# Patient Record
Sex: MALE | Race: WHITE | NOT HISPANIC OR LATINO | Employment: OTHER | URBAN - METROPOLITAN AREA
[De-identification: names, ages, dates, MRNs, and addresses within clinical notes are randomized per-mention and may not be internally consistent; named-entity substitution may affect disease eponyms.]

---

## 2017-01-10 ENCOUNTER — HOSPITAL ENCOUNTER (OUTPATIENT)
Dept: PAIN MEDICINE | Facility: HOSPITAL | Age: 67
Discharge: HOME | End: 2017-01-10
Attending: ANESTHESIOLOGY | Admitting: ANESTHESIOLOGY

## 2017-02-01 ENCOUNTER — HOSPITAL ENCOUNTER (OUTPATIENT)
Dept: PAIN MEDICINE | Facility: HOSPITAL | Age: 67
Discharge: HOME | End: 2017-02-01
Attending: ANESTHESIOLOGY | Admitting: ANESTHESIOLOGY

## 2017-02-16 ENCOUNTER — HOSPITAL ENCOUNTER (OUTPATIENT)
Dept: PAIN MEDICINE | Facility: HOSPITAL | Age: 67
Discharge: HOME | End: 2017-02-16
Attending: ANESTHESIOLOGY | Admitting: ANESTHESIOLOGY

## 2017-02-16 LAB
AMPHETAMINES UR QL SCN: NEGATIVE
BZE UR QL SCN: NEGATIVE
CREAT 24H UR-MCNC: NORMAL MG/DL
METHADONE UR QL SCN: NEGATIVE
OPIATE CONFIRMATION URINE: NORMAL
THC SERPLBLD CFM-MCNC: NEGATIVE NG/ML

## 2017-02-28 ENCOUNTER — HOSPITAL ENCOUNTER (OUTPATIENT)
Dept: PAIN MEDICINE | Facility: HOSPITAL | Age: 67
Discharge: HOME | End: 2017-02-28
Attending: ANESTHESIOLOGY | Admitting: ANESTHESIOLOGY

## 2017-03-07 ENCOUNTER — HOSPITAL ENCOUNTER (OUTPATIENT)
Dept: PAIN MEDICINE | Facility: HOSPITAL | Age: 67
Discharge: HOME | End: 2017-03-07
Attending: ANESTHESIOLOGY | Admitting: ANESTHESIOLOGY

## 2017-03-17 ENCOUNTER — HOSPITAL ENCOUNTER (OUTPATIENT)
Dept: PAIN MEDICINE | Facility: HOSPITAL | Age: 67
Discharge: HOME | End: 2017-03-17
Attending: ANESTHESIOLOGY | Admitting: ANESTHESIOLOGY

## 2017-04-28 ENCOUNTER — HOSPITAL ENCOUNTER (OUTPATIENT)
Dept: PAIN MEDICINE | Facility: HOSPITAL | Age: 67
Discharge: HOME | End: 2017-04-28
Attending: ANESTHESIOLOGY | Admitting: ANESTHESIOLOGY

## 2021-04-14 ENCOUNTER — ON CAMPUS - OUTPATIENT (OUTPATIENT)
Dept: URBAN - METROPOLITAN AREA HOSPITAL 77 | Facility: HOSPITAL | Age: 71
End: 2021-04-14

## 2021-04-14 DIAGNOSIS — R19.7 DIARRHEA, UNSPECIFIED: ICD-10-CM

## 2021-04-14 DIAGNOSIS — K62.1 RECTAL POLYP: ICD-10-CM

## 2021-04-14 DIAGNOSIS — R19.4 CHANGE IN BOWEL HABIT: ICD-10-CM

## 2021-04-14 PROCEDURE — 45380 COLONOSCOPY AND BIOPSY: CPT | Performed by: INTERNAL MEDICINE

## 2022-02-07 ENCOUNTER — OFFICE (OUTPATIENT)
Dept: URBAN - METROPOLITAN AREA CLINIC 64 | Facility: CLINIC | Age: 72
End: 2022-02-07

## 2022-02-07 VITALS
WEIGHT: 154 LBS | HEART RATE: 57 BPM | SYSTOLIC BLOOD PRESSURE: 167 MMHG | HEIGHT: 67 IN | DIASTOLIC BLOOD PRESSURE: 100 MMHG

## 2022-02-07 DIAGNOSIS — R19.7 DIARRHEA, UNSPECIFIED: ICD-10-CM

## 2022-02-07 PROCEDURE — 99213 OFFICE O/P EST LOW 20 MIN: CPT | Performed by: INTERNAL MEDICINE

## 2022-02-07 NOTE — SERVICEHPINOTES
EDUARDO HARGROVE  is a    71   year old  male   who is being seen in the office today for change in bowel habits with loose bm. He has 2-3 loose bm daily and previously had one formed bm daily. No brbpr or melena. No wt loss or abd pain.  No med changes. He has had symptoms for over a year and had colonoscopy last year with results below. br
br
br
br  2021 colonoscopy normal mucosa, bx neg, hp polyp

## 2022-06-28 ENCOUNTER — OFFICE (OUTPATIENT)
Dept: URBAN - METROPOLITAN AREA CLINIC 64 | Facility: CLINIC | Age: 72
End: 2022-06-28

## 2022-06-28 VITALS
SYSTOLIC BLOOD PRESSURE: 130 MMHG | HEIGHT: 67 IN | DIASTOLIC BLOOD PRESSURE: 78 MMHG | HEART RATE: 60 BPM | WEIGHT: 155 LBS

## 2022-06-28 DIAGNOSIS — R19.7 DIARRHEA, UNSPECIFIED: ICD-10-CM

## 2022-06-28 PROCEDURE — 99213 OFFICE O/P EST LOW 20 MIN: CPT | Performed by: INTERNAL MEDICINE

## 2023-01-19 ENCOUNTER — HOSPITAL ENCOUNTER (OUTPATIENT)
Facility: HOSPITAL | Age: 73
Discharge: HOME | End: 2023-01-19
Attending: EMERGENCY MEDICINE | Admitting: EMERGENCY MEDICINE
Payer: MEDICARE

## 2023-01-19 VITALS
HEART RATE: 61 BPM | BODY MASS INDEX: 24.33 KG/M2 | DIASTOLIC BLOOD PRESSURE: 87 MMHG | SYSTOLIC BLOOD PRESSURE: 153 MMHG | OXYGEN SATURATION: 97 % | TEMPERATURE: 98.3 F | HEIGHT: 67 IN | RESPIRATION RATE: 13 BRPM | WEIGHT: 155 LBS

## 2023-01-19 DIAGNOSIS — B02.9 HERPES ZOSTER WITHOUT COMPLICATION: Primary | ICD-10-CM

## 2023-01-19 DIAGNOSIS — R21 RASH: ICD-10-CM

## 2023-01-19 PROCEDURE — G0463 HOSPITAL OUTPT CLINIC VISIT: HCPCS | Performed by: EMERGENCY MEDICINE

## 2023-01-19 PROCEDURE — 99203 OFFICE O/P NEW LOW 30 MIN: CPT | Performed by: EMERGENCY MEDICINE

## 2023-01-19 RX ORDER — PREDNISONE 20 MG/1
20 TABLET ORAL 2 TIMES DAILY
Qty: 10 TABLET | Refills: 0 | Status: SHIPPED | OUTPATIENT
Start: 2023-01-19 | End: 2023-01-24

## 2023-01-19 RX ORDER — ACYCLOVIR 400 MG/1
800 TABLET ORAL
Qty: 70 TABLET | Refills: 0 | Status: SHIPPED | OUTPATIENT
Start: 2023-01-19 | End: 2023-01-26

## 2023-01-19 NOTE — FSED PROVIDER NOTE
Subjective   History of Present Illness  Patient is a 72-year-old who presents with a 2-day history of a rash that is prickly and tingly with pain.  It was vesicular and was crusted over.  Symptoms were gradual onset.  They are constant.  He noticed the pain more at night.        Review of Systems   Constitutional: Negative.    HENT: Negative.  Negative for sinus pressure and sore throat.    Eyes: Negative.    Respiratory: Negative.  Negative for cough and shortness of breath.    Cardiovascular: Negative.  Negative for chest pain.   Gastrointestinal: Negative for abdominal pain, diarrhea, nausea and vomiting.   Endocrine: Negative.    Genitourinary: Negative.    Musculoskeletal: Negative.    Skin: Positive for rash.   Allergic/Immunologic: Negative.    Neurological: Negative.  Negative for syncope, numbness and headaches.   Hematological: Negative.    Psychiatric/Behavioral: Negative.  Negative for confusion.   All other systems reviewed and are negative.      History reviewed. No pertinent past medical history.    No Known Allergies    History reviewed. No pertinent surgical history.    History reviewed. No pertinent family history.    Social History     Socioeconomic History   • Marital status:            Objective   Physical Exam  Vitals and nursing note reviewed.   Constitutional:       Appearance: Normal appearance. He is normal weight.   HENT:      Head: Normocephalic and atraumatic.      Right Ear: External ear normal.      Left Ear: External ear normal.      Nose: Nose normal.      Mouth/Throat:      Mouth: Mucous membranes are moist.      Pharynx: Oropharynx is clear.   Eyes:      Extraocular Movements: Extraocular movements intact.      Conjunctiva/sclera: Conjunctivae normal.      Pupils: Pupils are equal, round, and reactive to light.   Cardiovascular:      Rate and Rhythm: Normal rate and regular rhythm.      Pulses: Normal pulses.      Heart sounds: Normal heart sounds.   Pulmonary:      Effort:  Pulmonary effort is normal.      Breath sounds: Normal breath sounds.   Abdominal:      General: Abdomen is flat.      Palpations: Abdomen is soft.      Tenderness: There is no abdominal tenderness.   Musculoskeletal:         General: Normal range of motion.      Cervical back: Normal range of motion and neck supple. No rigidity.   Skin:     General: Skin is warm and dry.      Findings: Rash present.      Comments: Healing vesicular rash on the right forearm consistent with a dermatomal distribution   Neurological:      General: No focal deficit present.      Mental Status: He is alert and oriented to person, place, and time. Mental status is at baseline.      Cranial Nerves: No cranial nerve deficit.      Sensory: No sensory deficit.      Motor: No weakness.      Gait: Gait normal.   Psychiatric:         Mood and Affect: Mood normal.         Behavior: Behavior normal.         Thought Content: Thought content normal.         Judgment: Judgment normal.         Procedures           ED Course                                           Medical Decision Making  This patient who presents with rash for 2 days  consistent with shingles. History and exam findings not consistent with dangerous etiologies of rash such as SJS/TEN, or secondary dangerous causes such as petechial rashes from thrombocytopenia or rickettsial infections. Rash does not appear urticarial with no signs of anaphylaxis either. Plan at this time is to treat symptomatically, instruct to follow up with PCP or derm PRN.    Herpes zoster without complication: complicated acute illness or injury  Rash: complicated acute illness or injury  Risk  Prescription drug management.          Final diagnoses:   Herpes zoster without complication   Rash       ED Disposition  ED Disposition     ED Disposition   Discharge    Condition   Stable    Comment   --             Facundo Root MD  [address] [facility]  [address]  [place]  [address] [phone]    Schedule an appointment as soon as possible for a visit in 2 days           Medication List      New Prescriptions    acyclovir 400 MG tablet  Commonly known as: ZOVIRAX  Take 2 tablets by mouth 5 (Five) Times a Day for 7 days. Take no more than 5 doses a day.     predniSONE 20 MG tablet  Commonly known as: DELTASONE  Take 1 tablet by mouth 2 (Two) Times a Day for 5 days.           Where to Get Your Medications      These medications were sent to [facility]/pharmacy #[id number] - [place], [place] - [address] [place] [address] [phone]  - [phone] [address] [place] [address]    Hours: 24-hours Phone: [phone]   · acyclovir 400 MG tablet  · predniSONE 20 MG tablet

## 2023-09-24 ENCOUNTER — HOSPITAL ENCOUNTER (EMERGENCY)
Facility: HOSPITAL | Age: 73
Discharge: HOME | End: 2023-09-24
Attending: PEDIATRICS | Admitting: PEDIATRICS
Payer: MEDICARE

## 2023-09-24 ENCOUNTER — APPOINTMENT (OUTPATIENT)
Dept: CT IMAGING | Facility: HOSPITAL | Age: 73
End: 2023-09-24
Payer: MEDICARE

## 2023-09-24 VITALS
BODY MASS INDEX: 24.33 KG/M2 | SYSTOLIC BLOOD PRESSURE: 137 MMHG | DIASTOLIC BLOOD PRESSURE: 84 MMHG | HEIGHT: 67 IN | HEART RATE: 57 BPM | OXYGEN SATURATION: 95 % | WEIGHT: 155 LBS | RESPIRATION RATE: 18 BRPM | TEMPERATURE: 97.9 F

## 2023-09-24 DIAGNOSIS — K04.7 DENTAL INFECTION: Primary | ICD-10-CM

## 2023-09-24 LAB
ALBUMIN SERPL-MCNC: 4.7 G/DL (ref 3.5–5.2)
ALBUMIN/GLOB SERPL: 1.5 G/DL
ALP SERPL-CCNC: 70 U/L (ref 39–117)
ALT SERPL W P-5'-P-CCNC: 16 U/L (ref 1–41)
ANION GAP SERPL CALCULATED.3IONS-SCNC: 9.9 MMOL/L (ref 5–15)
AST SERPL-CCNC: 16 U/L (ref 1–40)
BASOPHILS # BLD AUTO: 0.03 10*3/MM3 (ref 0–0.2)
BASOPHILS NFR BLD AUTO: 0.3 % (ref 0–1.5)
BILIRUB SERPL-MCNC: 0.4 MG/DL (ref 0–1.2)
BUN SERPL-MCNC: 13 MG/DL (ref 8–23)
BUN/CREAT SERPL: 14.9 (ref 7–25)
CALCIUM SPEC-SCNC: 9.6 MG/DL (ref 8.6–10.5)
CHLORIDE SERPL-SCNC: 99 MMOL/L (ref 98–107)
CO2 SERPL-SCNC: 28.1 MMOL/L (ref 22–29)
CREAT SERPL-MCNC: 0.87 MG/DL (ref 0.76–1.27)
D-LACTATE SERPL-SCNC: 0.9 MMOL/L (ref 0.5–2)
DEPRECATED RDW RBC AUTO: 43 FL (ref 37–54)
EGFRCR SERPLBLD CKD-EPI 2021: 90 ML/MIN/1.73
EOSINOPHIL # BLD AUTO: 0.2 10*3/MM3 (ref 0–0.4)
EOSINOPHIL NFR BLD AUTO: 2.2 % (ref 0.3–6.2)
ERYTHROCYTE [DISTWIDTH] IN BLOOD BY AUTOMATED COUNT: 12.5 % (ref 12.3–15.4)
GLOBULIN UR ELPH-MCNC: 3.1 GM/DL
GLUCOSE SERPL-MCNC: 87 MG/DL (ref 65–99)
HCT VFR BLD AUTO: 46.6 % (ref 37.5–51)
HGB BLD-MCNC: 15.3 G/DL (ref 13–17.7)
IMM GRANULOCYTES # BLD AUTO: 0.02 10*3/MM3 (ref 0–0.05)
IMM GRANULOCYTES NFR BLD AUTO: 0.2 % (ref 0–0.5)
LYMPHOCYTES # BLD AUTO: 2.13 10*3/MM3 (ref 0.7–3.1)
LYMPHOCYTES NFR BLD AUTO: 23.9 % (ref 19.6–45.3)
MCH RBC QN AUTO: 30.3 PG (ref 26.6–33)
MCHC RBC AUTO-ENTMCNC: 32.8 G/DL (ref 31.5–35.7)
MCV RBC AUTO: 92.3 FL (ref 79–97)
MONOCYTES # BLD AUTO: 0.89 10*3/MM3 (ref 0.1–0.9)
MONOCYTES NFR BLD AUTO: 10 % (ref 5–12)
NEUTROPHILS NFR BLD AUTO: 5.65 10*3/MM3 (ref 1.7–7)
NEUTROPHILS NFR BLD AUTO: 63.4 % (ref 42.7–76)
PLATELET # BLD AUTO: 211 10*3/MM3 (ref 140–450)
PMV BLD AUTO: 10.9 FL (ref 6–12)
POTASSIUM SERPL-SCNC: 3.9 MMOL/L (ref 3.5–5.2)
PROT SERPL-MCNC: 7.8 G/DL (ref 6–8.5)
RBC # BLD AUTO: 5.05 10*6/MM3 (ref 4.14–5.8)
SODIUM SERPL-SCNC: 137 MMOL/L (ref 136–145)
WBC NRBC COR # BLD: 8.92 10*3/MM3 (ref 3.4–10.8)

## 2023-09-24 PROCEDURE — 96365 THER/PROPH/DIAG IV INF INIT: CPT

## 2023-09-24 PROCEDURE — 99285 EMERGENCY DEPT VISIT HI MDM: CPT

## 2023-09-24 PROCEDURE — 25010000002 KETOROLAC TROMETHAMINE PER 15 MG

## 2023-09-24 PROCEDURE — 87040 BLOOD CULTURE FOR BACTERIA: CPT

## 2023-09-24 PROCEDURE — 83605 ASSAY OF LACTIC ACID: CPT

## 2023-09-24 PROCEDURE — 70487 CT MAXILLOFACIAL W/DYE: CPT

## 2023-09-24 PROCEDURE — 85025 COMPLETE CBC W/AUTO DIFF WBC: CPT

## 2023-09-24 PROCEDURE — 96375 TX/PRO/DX INJ NEW DRUG ADDON: CPT

## 2023-09-24 PROCEDURE — 36415 COLL VENOUS BLD VENIPUNCTURE: CPT

## 2023-09-24 PROCEDURE — 80053 COMPREHEN METABOLIC PANEL: CPT

## 2023-09-24 PROCEDURE — 25010000002 DEXAMETHASONE SODIUM PHOSPHATE 10 MG/ML SOLUTION

## 2023-09-24 PROCEDURE — 25510000001 IOPAMIDOL PER 1 ML: Performed by: PEDIATRICS

## 2023-09-24 PROCEDURE — 25010000002 CEFTRIAXONE PER 250 MG

## 2023-09-24 RX ORDER — SODIUM CHLORIDE 0.9 % (FLUSH) 0.9 %
10 SYRINGE (ML) INJECTION AS NEEDED
Status: DISCONTINUED | OUTPATIENT
Start: 2023-09-24 | End: 2023-09-24 | Stop reason: HOSPADM

## 2023-09-24 RX ORDER — DEXAMETHASONE SODIUM PHOSPHATE 10 MG/ML
10 INJECTION, SOLUTION INTRAMUSCULAR; INTRAVENOUS ONCE
Status: COMPLETED | OUTPATIENT
Start: 2023-09-24 | End: 2023-09-24

## 2023-09-24 RX ORDER — KETOROLAC TROMETHAMINE 30 MG/ML
15 INJECTION, SOLUTION INTRAMUSCULAR; INTRAVENOUS ONCE
Status: COMPLETED | OUTPATIENT
Start: 2023-09-24 | End: 2023-09-24

## 2023-09-24 RX ORDER — AMOXICILLIN AND CLAVULANATE POTASSIUM 875; 125 MG/1; MG/1
1 TABLET, FILM COATED ORAL 2 TIMES DAILY
Qty: 20 TABLET | Refills: 0 | Status: SHIPPED | OUTPATIENT
Start: 2023-09-24 | End: 2023-10-04

## 2023-09-24 RX ADMIN — IOPAMIDOL 50 ML: 755 INJECTION, SOLUTION INTRAVENOUS at 15:16

## 2023-09-24 RX ADMIN — KETOROLAC TROMETHAMINE 15 MG: 30 INJECTION, SOLUTION INTRAMUSCULAR at 14:13

## 2023-09-24 RX ADMIN — CEFTRIAXONE 1000 MG: 1 INJECTION, POWDER, FOR SOLUTION INTRAMUSCULAR; INTRAVENOUS at 14:16

## 2023-09-24 RX ADMIN — DEXAMETHASONE SODIUM PHOSPHATE 10 MG: 10 INJECTION, SOLUTION INTRAMUSCULAR; INTRAVENOUS at 14:14

## 2023-09-24 NOTE — DISCHARGE INSTRUCTIONS
Take antibiotic as prescribed until completion.  Follow-up with dentist for an appointment as soon as possible.  Return to emergency department for worsening symptoms or other medical emergencies.  Recommended follow-up with PCP.  Refer to the attached instructions for further information.

## 2023-09-24 NOTE — FSED PROVIDER NOTE
Subjective   History of Present Illness  Patient is a 73-year-old male who presents for right facial and mouth swelling.  Symptoms onset yesterday and worsened today.  Reports severe tenderness right upper gum area.  Pain was an 8/10 yesterday, and a 4/10 today.  Has not been seen by dentist.  Denies fever, shortness of breath, chest pain.  Reports eating pork rind x2 nights ago that may have coincided with onset of the pain.      Review of Systems   Constitutional:  Negative for chills and fever.   HENT:  Positive for dental problem and facial swelling.    Eyes:  Positive for redness.   Respiratory:  Negative for shortness of breath.    Cardiovascular:  Negative for chest pain.   Gastrointestinal:  Negative for abdominal pain.   Skin:  Negative for color change, pallor, rash and wound.     History reviewed. No pertinent past medical history.    No Known Allergies    History reviewed. No pertinent surgical history.    History reviewed. No pertinent family history.    Social History     Socioeconomic History    Marital status:    Tobacco Use    Smoking status: Never    Smokeless tobacco: Never   Vaping Use    Vaping Use: Never used   Substance and Sexual Activity    Alcohol use: Never    Drug use: Never    Sexual activity: Defer           Objective   Physical Exam  Constitutional:       General: He is not in acute distress.     Appearance: He is normal weight. He is ill-appearing. He is not toxic-appearing.   HENT:      Head: Normocephalic and atraumatic.      Comments: Right-sided moderate to severe facial swelling extending from right thigh to right mouth, causing downward tilt of the right side mouth.  Minimal to no tenderness or erythema.     Nose: Nose normal.      Mouth/Throat:      Mouth: Mucous membranes are moist.      Dentition: Abnormal dentition. Dental tenderness, gingival swelling and dental caries present.      Tongue: No lesions.      Palate: No mass and lesions.      Pharynx: Oropharynx is  clear. Uvula midline. No pharyngeal swelling, posterior oropharyngeal erythema or uvula swelling.      Tonsils: No tonsillar exudate or tonsillar abscesses.        Comments: Upper right-sided molars with moderate to severe gum swelling, redness, erythema.  No evidence of drainage.  Generally poor dentition.  Eyes:      Extraocular Movements: Extraocular movements intact.      Pupils: Pupils are equal, round, and reactive to light.      Comments: Lower eyelid conjunctive a mildly erythematous and swollen.  No discharge.   Cardiovascular:      Rate and Rhythm: Normal rate and regular rhythm.   Pulmonary:      Effort: Pulmonary effort is normal. No respiratory distress.   Skin:     General: Skin is warm and dry.      Findings: No bruising or lesion.      Comments: Area of swelling on face is not indurated, erythematous, fluctuant.  No wounds or lesions.   Neurological:      Mental Status: He is alert.   Psychiatric:         Mood and Affect: Mood normal.         Behavior: Behavior normal.       Procedures           ED Course  ED Course as of 09/24/23 1627   Sun Sep 24, 2023   1440 WBC: 8.92 [AS]   1440 Neutrophil Rel %: 63.4 [AS]   1440 Hemoglobin: 15.3 [AS]   1513 Glucose: 87 [AS]   1513 Creatinine: 0.87 [AS]   1513 Sodium: 137 [AS]   1513 Potassium: 3.9 [AS]   1513 Chloride: 99 [AS]   1513 eGFR: 90.0 [AS]   1515 Lactate: 0.9 [AS]   1607 CT facial bones with contrast:   IMPRESSION:  1.Apical lucencies involving right maxillary second premolar and bilateral mandibular second molars, which could represent odontogenic infection.  2.No evidence of drainable soft tissue abscess.  3.Moderate frothy paranasal sinus mucosal disease. Correlate for acute sinusitis. [AS]      ED Course User Index  [AS] Yojana Rivero PA-C                                           Medical Decision Making  Patient is a 73-year-old male who presents to the emergency department for right facial and oral swelling.  Exam is consistent with a dental  infection.  Due to severity of swelling, will check lab work and facial bone CT to evaluate for extension into facial structures of bones.  Lab work-up is unremarkable.  Normal CBC, CMP, lactic.  CT reveals odontogenic infection, consistent with location of patient's symptoms on exam.  Patient given Rocephin, Toradol, Decadron IV.  Reports pain resolved prior to discharge.  Prescription for Augmentin sent to pharmacy.  Patient to follow-up with dentist as soon as possible for visit.  Discussed when to return to the emergency department.  Answered all questions.  Patient verbalized understanding and was agreeable to plan and discharge.    My differential diagnosis includes but is not limited to: Dental infection, dental abscess, osteomyelitis, sinusitis, conjunctivitis, sepsis        Final diagnoses:   Dental infection       ED Disposition  ED Disposition       ED Disposition   Discharge    Condition   Stable    Comment   --               Facundo Root MD  [address] [place] [address] [place] [address] [phone]    Schedule an appointment as soon as possible for a visit            Medication List        New Prescriptions      amoxicillin-clavulanate 875-125 MG per tablet  Commonly known as: AUGMENTIN  Take 1 tablet by mouth 2 (Two) Times a Day for 10 days.               Where to Get Your Medications        These medications were sent to [facility]/pharmacy #[id number] - [place], [place] - [address] [place] [address] - [phone]  - [phone] [address] [place] [address]      Hours: 24-hours Phone: [phone]   amoxicillin-clavulanate 875-125 MG per tablet

## 2023-09-29 LAB
BACTERIA SPEC AEROBE CULT: NORMAL
BACTERIA SPEC AEROBE CULT: NORMAL

## 2024-11-09 ENCOUNTER — HOSPITAL ENCOUNTER (INPATIENT)
Facility: HOSPITAL | Age: 74
LOS: 3 days | Discharge: HOME | End: 2024-11-12
Attending: EMERGENCY MEDICINE | Admitting: FAMILY MEDICINE
Payer: MEDICARE

## 2024-11-09 ENCOUNTER — APPOINTMENT (OUTPATIENT)
Dept: GENERAL RADIOLOGY | Facility: HOSPITAL | Age: 74
End: 2024-11-09
Payer: MEDICARE

## 2024-11-09 ENCOUNTER — APPOINTMENT (OUTPATIENT)
Dept: MRI IMAGING | Facility: HOSPITAL | Age: 74
End: 2024-11-09
Payer: MEDICARE

## 2024-11-09 ENCOUNTER — APPOINTMENT (OUTPATIENT)
Dept: CT IMAGING | Facility: HOSPITAL | Age: 74
End: 2024-11-09
Payer: MEDICARE

## 2024-11-09 DIAGNOSIS — R10.13 EPIGASTRIC PAIN: Primary | ICD-10-CM

## 2024-11-09 DIAGNOSIS — K80.50 CHOLEDOCHOLITHIASIS: ICD-10-CM

## 2024-11-09 LAB
ALBUMIN SERPL-MCNC: 4.5 G/DL (ref 3.5–5.2)
ALBUMIN/GLOB SERPL: 1.6 G/DL
ALP SERPL-CCNC: 75 U/L (ref 39–117)
ALT SERPL W P-5'-P-CCNC: 26 U/L (ref 1–41)
ANION GAP SERPL CALCULATED.3IONS-SCNC: 11 MMOL/L (ref 5–15)
ANION GAP SERPL CALCULATED.3IONS-SCNC: 11.6 MMOL/L (ref 5–15)
AST SERPL-CCNC: 33 U/L (ref 1–40)
BACTERIA UR QL AUTO: NORMAL /HPF
BASOPHILS # BLD AUTO: 0.01 10*3/MM3 (ref 0–0.2)
BASOPHILS # BLD AUTO: 0.03 10*3/MM3 (ref 0–0.2)
BASOPHILS NFR BLD AUTO: 0.1 % (ref 0–1.5)
BASOPHILS NFR BLD AUTO: 0.4 % (ref 0–1.5)
BILIRUB SERPL-MCNC: 0.4 MG/DL (ref 0–1.2)
BILIRUB UR QL STRIP: NEGATIVE
BUN SERPL-MCNC: 15 MG/DL (ref 8–23)
BUN SERPL-MCNC: 16 MG/DL (ref 8–23)
BUN/CREAT SERPL: 18.3 (ref 7–25)
BUN/CREAT SERPL: 18.8 (ref 7–25)
CALCIUM SPEC-SCNC: 9 MG/DL (ref 8.6–10.5)
CALCIUM SPEC-SCNC: 9.3 MG/DL (ref 8.6–10.5)
CHLORIDE SERPL-SCNC: 102 MMOL/L (ref 98–107)
CHLORIDE SERPL-SCNC: 105 MMOL/L (ref 98–107)
CLARITY UR: CLEAR
CO2 SERPL-SCNC: 26.4 MMOL/L (ref 22–29)
CO2 SERPL-SCNC: 27 MMOL/L (ref 22–29)
COLOR UR: YELLOW
CREAT SERPL-MCNC: 0.82 MG/DL (ref 0.76–1.27)
CREAT SERPL-MCNC: 0.85 MG/DL (ref 0.76–1.27)
DEPRECATED RDW RBC AUTO: 43.5 FL (ref 37–54)
DEPRECATED RDW RBC AUTO: 44 FL (ref 37–54)
EGFRCR SERPLBLD CKD-EPI 2021: 90.1 ML/MIN/1.73
EGFRCR SERPLBLD CKD-EPI 2021: 91 ML/MIN/1.73
EOSINOPHIL # BLD AUTO: 0.02 10*3/MM3 (ref 0–0.4)
EOSINOPHIL # BLD AUTO: 0.27 10*3/MM3 (ref 0–0.4)
EOSINOPHIL NFR BLD AUTO: 0.2 % (ref 0.3–6.2)
EOSINOPHIL NFR BLD AUTO: 3.3 % (ref 0.3–6.2)
ERYTHROCYTE [DISTWIDTH] IN BLOOD BY AUTOMATED COUNT: 12.6 % (ref 12.3–15.4)
ERYTHROCYTE [DISTWIDTH] IN BLOOD BY AUTOMATED COUNT: 12.7 % (ref 12.3–15.4)
GLOBULIN UR ELPH-MCNC: 2.9 GM/DL
GLUCOSE SERPL-MCNC: 119 MG/DL (ref 65–99)
GLUCOSE SERPL-MCNC: 97 MG/DL (ref 65–99)
GLUCOSE UR STRIP-MCNC: NEGATIVE MG/DL
GRAN CASTS URNS QL MICRO: NORMAL /LPF
HCT VFR BLD AUTO: 43.7 % (ref 37.5–51)
HCT VFR BLD AUTO: 46.2 % (ref 37.5–51)
HGB BLD-MCNC: 14.5 G/DL (ref 13–17.7)
HGB BLD-MCNC: 15.4 G/DL (ref 13–17.7)
HGB UR QL STRIP.AUTO: ABNORMAL
HYALINE CASTS UR QL AUTO: NORMAL /LPF
IMM GRANULOCYTES # BLD AUTO: 0.01 10*3/MM3 (ref 0–0.05)
IMM GRANULOCYTES # BLD AUTO: 0.03 10*3/MM3 (ref 0–0.05)
IMM GRANULOCYTES NFR BLD AUTO: 0.1 % (ref 0–0.5)
IMM GRANULOCYTES NFR BLD AUTO: 0.3 % (ref 0–0.5)
KETONES UR QL STRIP: ABNORMAL
LEUKOCYTE ESTERASE UR QL STRIP.AUTO: NEGATIVE
LIPASE SERPL-CCNC: 27 U/L (ref 13–60)
LYMPHOCYTES # BLD AUTO: 0.6 10*3/MM3 (ref 0.7–3.1)
LYMPHOCYTES # BLD AUTO: 2.22 10*3/MM3 (ref 0.7–3.1)
LYMPHOCYTES NFR BLD AUTO: 27.1 % (ref 19.6–45.3)
LYMPHOCYTES NFR BLD AUTO: 5.4 % (ref 19.6–45.3)
MAGNESIUM SERPL-MCNC: 1.9 MG/DL (ref 1.6–2.4)
MCH RBC QN AUTO: 31.2 PG (ref 26.6–33)
MCH RBC QN AUTO: 31.5 PG (ref 26.6–33)
MCHC RBC AUTO-ENTMCNC: 33.2 G/DL (ref 31.5–35.7)
MCHC RBC AUTO-ENTMCNC: 33.3 G/DL (ref 31.5–35.7)
MCV RBC AUTO: 93.5 FL (ref 79–97)
MCV RBC AUTO: 94.8 FL (ref 79–97)
MONOCYTES # BLD AUTO: 0.49 10*3/MM3 (ref 0.1–0.9)
MONOCYTES # BLD AUTO: 0.87 10*3/MM3 (ref 0.1–0.9)
MONOCYTES NFR BLD AUTO: 10.6 % (ref 5–12)
MONOCYTES NFR BLD AUTO: 4.4 % (ref 5–12)
NEUTROPHILS NFR BLD AUTO: 4.8 10*3/MM3 (ref 1.7–7)
NEUTROPHILS NFR BLD AUTO: 58.5 % (ref 42.7–76)
NEUTROPHILS NFR BLD AUTO: 89.6 % (ref 42.7–76)
NEUTROPHILS NFR BLD AUTO: 9.92 10*3/MM3 (ref 1.7–7)
NITRITE UR QL STRIP: NEGATIVE
NRBC BLD AUTO-RTO: 0 /100 WBC (ref 0–0.2)
PH UR STRIP.AUTO: 6 [PH] (ref 5–8)
PLATELET # BLD AUTO: 162 10*3/MM3 (ref 140–450)
PLATELET # BLD AUTO: 181 10*3/MM3 (ref 140–450)
PMV BLD AUTO: 10.9 FL (ref 6–12)
PMV BLD AUTO: 11.3 FL (ref 6–12)
POTASSIUM SERPL-SCNC: 3.3 MMOL/L (ref 3.5–5.2)
POTASSIUM SERPL-SCNC: 3.8 MMOL/L (ref 3.5–5.2)
PROT SERPL-MCNC: 7.4 G/DL (ref 6–8.5)
PROT UR QL STRIP: NEGATIVE
RBC # BLD AUTO: 4.61 10*6/MM3 (ref 4.14–5.8)
RBC # BLD AUTO: 4.94 10*6/MM3 (ref 4.14–5.8)
RBC # UR STRIP: NORMAL /HPF
REF LAB TEST METHOD: NORMAL
SODIUM SERPL-SCNC: 140 MMOL/L (ref 136–145)
SODIUM SERPL-SCNC: 143 MMOL/L (ref 136–145)
SP GR UR STRIP: 1.01 (ref 1–1.03)
SQUAMOUS #/AREA URNS HPF: NORMAL /HPF
TROPONIN T SERPL HS-MCNC: 12 NG/L
TROPONIN T SERPL HS-MCNC: 13 NG/L
UROBILINOGEN UR QL STRIP: ABNORMAL
WBC # UR STRIP: NORMAL /HPF
WBC NRBC COR # BLD AUTO: 11.07 10*3/MM3 (ref 3.4–10.8)
WBC NRBC COR # BLD AUTO: 8.2 10*3/MM3 (ref 3.4–10.8)

## 2024-11-09 PROCEDURE — 99285 EMERGENCY DEPT VISIT HI MDM: CPT

## 2024-11-09 PROCEDURE — 85025 COMPLETE CBC W/AUTO DIFF WBC: CPT | Performed by: NURSE PRACTITIONER

## 2024-11-09 PROCEDURE — 80053 COMPREHEN METABOLIC PANEL: CPT

## 2024-11-09 PROCEDURE — 25010000002 HYDROMORPHONE 1 MG/ML SOLUTION

## 2024-11-09 PROCEDURE — 93005 ELECTROCARDIOGRAM TRACING: CPT | Performed by: EMERGENCY MEDICINE

## 2024-11-09 PROCEDURE — 84484 ASSAY OF TROPONIN QUANT: CPT | Performed by: NURSE PRACTITIONER

## 2024-11-09 PROCEDURE — A9579 GAD-BASE MR CONTRAST NOS,1ML: HCPCS | Performed by: FAMILY MEDICINE

## 2024-11-09 PROCEDURE — 83735 ASSAY OF MAGNESIUM: CPT | Performed by: NURSE PRACTITIONER

## 2024-11-09 PROCEDURE — 25010000002 GADOTERIDOL PER 1 ML: Performed by: FAMILY MEDICINE

## 2024-11-09 PROCEDURE — 25510000001 IOPAMIDOL PER 1 ML: Performed by: EMERGENCY MEDICINE

## 2024-11-09 PROCEDURE — 25010000002 ONDANSETRON PER 1 MG

## 2024-11-09 PROCEDURE — 74183 MRI ABD W/O CNTR FLWD CNTR: CPT

## 2024-11-09 PROCEDURE — 85025 COMPLETE CBC W/AUTO DIFF WBC: CPT

## 2024-11-09 PROCEDURE — 74177 CT ABD & PELVIS W/CONTRAST: CPT

## 2024-11-09 PROCEDURE — 25010000002 METOCLOPRAMIDE PER 10 MG

## 2024-11-09 PROCEDURE — 84484 ASSAY OF TROPONIN QUANT: CPT

## 2024-11-09 PROCEDURE — 93010 ELECTROCARDIOGRAM REPORT: CPT | Performed by: EMERGENCY MEDICINE

## 2024-11-09 PROCEDURE — 83690 ASSAY OF LIPASE: CPT

## 2024-11-09 PROCEDURE — 25010000002 MORPHINE PER 10 MG

## 2024-11-09 PROCEDURE — 81001 URINALYSIS AUTO W/SCOPE: CPT

## 2024-11-09 PROCEDURE — 36415 COLL VENOUS BLD VENIPUNCTURE: CPT

## 2024-11-09 PROCEDURE — 71045 X-RAY EXAM CHEST 1 VIEW: CPT

## 2024-11-09 PROCEDURE — 25810000003 SODIUM CHLORIDE 0.9 % SOLUTION: Performed by: NURSE PRACTITIONER

## 2024-11-09 RX ORDER — SODIUM CHLORIDE 0.9 % (FLUSH) 0.9 %
10 SYRINGE (ML) INJECTION AS NEEDED
Status: DISCONTINUED | OUTPATIENT
Start: 2024-11-09 | End: 2024-11-12 | Stop reason: HOSPADM

## 2024-11-09 RX ORDER — HYDROMORPHONE HYDROCHLORIDE 1 MG/ML
0.5 INJECTION, SOLUTION INTRAMUSCULAR; INTRAVENOUS; SUBCUTANEOUS
Status: DISCONTINUED | OUTPATIENT
Start: 2024-11-09 | End: 2024-11-12 | Stop reason: HOSPADM

## 2024-11-09 RX ORDER — PANTOPRAZOLE SODIUM 40 MG/10ML
40 INJECTION, POWDER, LYOPHILIZED, FOR SOLUTION INTRAVENOUS
Status: DISCONTINUED | OUTPATIENT
Start: 2024-11-09 | End: 2024-11-12

## 2024-11-09 RX ORDER — ONDANSETRON 2 MG/ML
4 INJECTION INTRAMUSCULAR; INTRAVENOUS EVERY 6 HOURS PRN
Status: DISCONTINUED | OUTPATIENT
Start: 2024-11-09 | End: 2024-11-12 | Stop reason: HOSPADM

## 2024-11-09 RX ORDER — KETOROLAC TROMETHAMINE 30 MG/ML
15 INJECTION, SOLUTION INTRAMUSCULAR; INTRAVENOUS EVERY 6 HOURS PRN
Status: DISCONTINUED | OUTPATIENT
Start: 2024-11-09 | End: 2024-11-12 | Stop reason: HOSPADM

## 2024-11-09 RX ORDER — SODIUM CHLORIDE 9 MG/ML
100 INJECTION, SOLUTION INTRAVENOUS CONTINUOUS
Status: DISCONTINUED | OUTPATIENT
Start: 2024-11-09 | End: 2024-11-10

## 2024-11-09 RX ORDER — METOCLOPRAMIDE HYDROCHLORIDE 5 MG/ML
10 INJECTION INTRAMUSCULAR; INTRAVENOUS ONCE
Status: COMPLETED | OUTPATIENT
Start: 2024-11-09 | End: 2024-11-09

## 2024-11-09 RX ORDER — MORPHINE SULFATE 2 MG/ML
2 INJECTION, SOLUTION INTRAMUSCULAR; INTRAVENOUS ONCE
Status: COMPLETED | OUTPATIENT
Start: 2024-11-09 | End: 2024-11-09

## 2024-11-09 RX ORDER — SODIUM CHLORIDE 0.9 % (FLUSH) 0.9 %
10 SYRINGE (ML) INJECTION EVERY 12 HOURS SCHEDULED
Status: DISCONTINUED | OUTPATIENT
Start: 2024-11-09 | End: 2024-11-12 | Stop reason: HOSPADM

## 2024-11-09 RX ORDER — NALOXONE HCL 0.4 MG/ML
0.4 VIAL (ML) INJECTION
Status: DISCONTINUED | OUTPATIENT
Start: 2024-11-09 | End: 2024-11-12 | Stop reason: HOSPADM

## 2024-11-09 RX ORDER — IOPAMIDOL 755 MG/ML
100 INJECTION, SOLUTION INTRAVASCULAR
Status: COMPLETED | OUTPATIENT
Start: 2024-11-09 | End: 2024-11-09

## 2024-11-09 RX ORDER — ONDANSETRON 2 MG/ML
4 INJECTION INTRAMUSCULAR; INTRAVENOUS ONCE
Status: COMPLETED | OUTPATIENT
Start: 2024-11-09 | End: 2024-11-09

## 2024-11-09 RX ORDER — FAMOTIDINE 10 MG/ML
20 INJECTION, SOLUTION INTRAVENOUS ONCE
Status: COMPLETED | OUTPATIENT
Start: 2024-11-09 | End: 2024-11-09

## 2024-11-09 RX ORDER — PANTOPRAZOLE SODIUM 40 MG/1
40 TABLET, DELAYED RELEASE ORAL DAILY
COMMUNITY
Start: 2017-01-04

## 2024-11-09 RX ADMIN — MORPHINE SULFATE 2 MG: 2 INJECTION, SOLUTION INTRAMUSCULAR; INTRAVENOUS at 17:15

## 2024-11-09 RX ADMIN — FAMOTIDINE 20 MG: 10 INJECTION INTRAVENOUS at 17:19

## 2024-11-09 RX ADMIN — PANTOPRAZOLE SODIUM 40 MG: 40 INJECTION, POWDER, FOR SOLUTION INTRAVENOUS at 22:09

## 2024-11-09 RX ADMIN — SODIUM CHLORIDE 100 ML/HR: 9 INJECTION, SOLUTION INTRAVENOUS at 22:10

## 2024-11-09 RX ADMIN — METOCLOPRAMIDE 10 MG: 5 INJECTION, SOLUTION INTRAMUSCULAR; INTRAVENOUS at 19:26

## 2024-11-09 RX ADMIN — HYDROMORPHONE HYDROCHLORIDE 0.5 MG: 1 INJECTION, SOLUTION INTRAMUSCULAR; INTRAVENOUS; SUBCUTANEOUS at 17:29

## 2024-11-09 RX ADMIN — IOPAMIDOL 100 ML: 755 INJECTION, SOLUTION INTRAVENOUS at 18:03

## 2024-11-09 RX ADMIN — GADOTERIDOL 20 ML: 279.3 INJECTION, SOLUTION INTRAVENOUS at 23:50

## 2024-11-09 RX ADMIN — HYDROMORPHONE HYDROCHLORIDE 0.5 MG: 1 INJECTION, SOLUTION INTRAMUSCULAR; INTRAVENOUS; SUBCUTANEOUS at 19:26

## 2024-11-09 RX ADMIN — Medication 10 ML: at 22:09

## 2024-11-09 RX ADMIN — ONDANSETRON 4 MG: 2 INJECTION, SOLUTION INTRAMUSCULAR; INTRAVENOUS at 17:25

## 2024-11-09 RX ADMIN — HYDROMORPHONE HYDROCHLORIDE 0.5 MG: 1 INJECTION, SOLUTION INTRAMUSCULAR; INTRAVENOUS; SUBCUTANEOUS at 18:17

## 2024-11-09 NOTE — FSED PROVIDER NOTE
Subjective   History of Present Illness  74-year-old male reports onset of epigastric pain around noon today.  He reports that he has a history of reflux disease and is on pantoprazole for his GERD.  He reports that the pain has continued.  He is denying chest pain and shortness of breath.  He denies a history of acute MI or stent placement.  He denies that he is a smoker or any history of COPD.  He reports that he has high blood pressure but he manages it with herbs.  He reports that he drove himself here to the ER and plans to drive home however he can get a ride if needed.  He is rating his pain 9 or 10 out of 10 to his epigastrium.  He reports he is mildly nauseated but denies any active vomiting or diarrhea.  He is denying any fever.        Review of Systems   All other systems reviewed and are negative.      History reviewed. No pertinent past medical history.    No Known Allergies    History reviewed. No pertinent surgical history.    History reviewed. No pertinent family history.    Social History     Socioeconomic History    Marital status:    Tobacco Use    Smoking status: Never    Smokeless tobacco: Never   Vaping Use    Vaping status: Never Used   Substance and Sexual Activity    Alcohol use: Never    Drug use: Never    Sexual activity: Defer           Objective   Physical Exam  Vitals and nursing note reviewed.   Constitutional:       General: He is in acute distress (Patient looks uncomfortable and he is grimacing reporting pain to his epigastric region).      Appearance: He is well-developed. He is not toxic-appearing.   HENT:      Head: Normocephalic and atraumatic.      Mouth/Throat:      Mouth: Mucous membranes are moist.   Eyes:      Extraocular Movements: Extraocular movements intact.   Cardiovascular:      Rate and Rhythm: Normal rate.      Heart sounds: Normal heart sounds.   Pulmonary:      Effort: Pulmonary effort is normal. No respiratory distress.      Breath sounds: Normal breath  sounds.   Abdominal:      General: Abdomen is flat. Bowel sounds are normal.      Palpations: Abdomen is soft.      Tenderness:  in the epigastric area There is guarding (Epigastric). There is no right CVA tenderness or left CVA tenderness. Negative signs include Reaves's sign and McBurney's sign.   Skin:     Capillary Refill: Capillary refill takes less than 2 seconds.   Neurological:      General: No focal deficit present.      Mental Status: He is alert and oriented to person, place, and time.         Procedures           ED Course  ED Course as of 11/09/24 1939   Sat Nov 09, 2024   1711 CBC is completely normal [WF]   1735 High-sensitivity troponin is 13    CMP is unremarkable with the exception of a potassium level of 3.3 [WF]   1831 CT scan abdomen pelvis  IMPRESSION:  There is dilatation of the common bile duct with mild intrahepatic biliary duct dilatation and prominence of the gallbladder. Correlate with any clinical findings of biliary obstruction. MRCP might be considered to assess for an obstructing distal common   duct lesion if warranted. No other acute abnormality suggested on this exam   [WF]   1836 Chest x-ray  Impression:  Mild peripheral reticular interstitial changes likely representing chronic interstitial lung disease   [WF]      ED Course User Index  [WF] Neville Eng Jr., APRN                                           Medical Decision Making  Differential diagnosis would include STEMI non-STEMI AAA, exacerbation of GERD, viral gastroenteritis, pancreatitis, cholecystitis.    Will initiate abdominal pain workup along with troponin and EKG.  I have ordered 2 mg morphine to treat patient's pain along with Pepcid and Zofran.  I have also ordered a contrasted CT scan abdomen pelvis as patient's main complaint is epigastric discomfort    I am told by nursing that patient is continuing to have sharp pains to his epigastrium after receiving 2 mg of morphine.  I have ordered 0.5 mg Dilaudid,  IV.    I reassessed the patient after receiving IV Dilaudid he reports a decrease in pain to 4 out of 5 of 10 on pain scale.    Approximately 30 minutes after receiving pain medication I was called to room 3 as the patient was having an acute episode of pain to his epigastrium, nursing repeated an EKG which is unremarkable patient was repositioned and he is reporting decreasing pain.    CT scan abdomen pelvis with contrast pending    CT scan abdomen pelvis indicates intra hepatic biliary duct dilation and prominent gallbladder, patient is requiring narcotic pain medicine to keep his pain down around 4 out of 10 on pain scale.  I have consulted with my attending physician.  Will seek admission to [facility] via hospitalist service for inpatient admission for MRCP, surgical consult, pain management.    I have spoken with Breanne Hager nurse practitioner with [facility] who accepts the patient for admission per Dr. Delgado.  Patient is agreeable to admit.    I have reassessed the patient, he is resting comfortably but indicates that his pain is creeping up to his epigastric area.  He admits to being anxious and concerned about return of 9 out of 10 pain.  I have ordered Reglan to help with his GI system and GERD and also Dilaudid 0.5 mg IV for pain.    Problems Addressed:  Epigastric pain: complicated acute illness or injury    Amount and/or Complexity of Data Reviewed  Labs: ordered.  Radiology: ordered.  ECG/medicine tests: ordered.    Risk  Prescription drug management.  Decision regarding hospitalization.        Final diagnoses:   Epigastric pain       ED Disposition  ED Disposition       ED Disposition   Decision to Admit    Condition   --    Comment   Level of Care: Med/Surg [1]   Diagnosis: Epigastric pain [238930]   Admitting Physician: SAULO LANE [[id number]]   Attending Physician: SAULO LANE [[id number]]   Isolate for COVID?: No [0]   Certification: I Certify That Inpatient Hospital Services Are Medically  Necessary For Greater Than 2 Midnights                 No follow-up provider specified.       Medication List      No changes were made to your prescriptions during this visit.

## 2024-11-10 ENCOUNTER — INPATIENT HOSPITAL (OUTPATIENT)
Age: 74
End: 2024-11-10
Payer: COMMERCIAL

## 2024-11-10 ENCOUNTER — ANESTHESIA EVENT (OUTPATIENT)
Dept: GASTROENTEROLOGY | Facility: HOSPITAL | Age: 74
End: 2024-11-10
Payer: MEDICARE

## 2024-11-10 ENCOUNTER — INPATIENT HOSPITAL (OUTPATIENT)
Dept: URBAN - METROPOLITAN AREA HOSPITAL 84 | Facility: HOSPITAL | Age: 74
End: 2024-11-10
Payer: COMMERCIAL

## 2024-11-10 ENCOUNTER — ANESTHESIA (OUTPATIENT)
Dept: GASTROENTEROLOGY | Facility: HOSPITAL | Age: 74
End: 2024-11-10
Payer: MEDICARE

## 2024-11-10 DIAGNOSIS — R93.3 ABNORMAL FINDINGS ON DIAGNOSTIC IMAGING OF OTHER PARTS OF DI: ICD-10-CM

## 2024-11-10 DIAGNOSIS — R10.13 EPIGASTRIC PAIN: ICD-10-CM

## 2024-11-10 PROBLEM — K80.50 CHOLEDOCHOLITHIASIS: Status: ACTIVE | Noted: 2024-11-09

## 2024-11-10 LAB
ALBUMIN SERPL-MCNC: 4.1 G/DL (ref 3.5–5.2)
ALBUMIN/GLOB SERPL: 1.5 G/DL
ALP SERPL-CCNC: 297 U/L (ref 39–117)
ALT SERPL W P-5'-P-CCNC: 195 U/L (ref 1–41)
ANION GAP SERPL CALCULATED.3IONS-SCNC: 13.8 MMOL/L (ref 5–15)
AST SERPL-CCNC: 217 U/L (ref 1–40)
BILIRUB SERPL-MCNC: 5.8 MG/DL (ref 0–1.2)
BUN SERPL-MCNC: 14 MG/DL (ref 8–23)
BUN/CREAT SERPL: 15.4 (ref 7–25)
CALCIUM SPEC-SCNC: 9.2 MG/DL (ref 8.6–10.5)
CHLORIDE SERPL-SCNC: 104 MMOL/L (ref 98–107)
CO2 SERPL-SCNC: 23.2 MMOL/L (ref 22–29)
CREAT SERPL-MCNC: 0.91 MG/DL (ref 0.76–1.27)
D-LACTATE SERPL-SCNC: 0.8 MMOL/L (ref 0.5–2)
EGFRCR SERPLBLD CKD-EPI 2021: 87.3 ML/MIN/1.73
GEN 5 2HR TROPONIN T REFLEX: 11 NG/L
GLOBULIN UR ELPH-MCNC: 2.8 GM/DL
GLUCOSE SERPL-MCNC: 84 MG/DL (ref 65–99)
POTASSIUM SERPL-SCNC: 3.9 MMOL/L (ref 3.5–5.2)
PROT SERPL-MCNC: 6.9 G/DL (ref 6–8.5)
QT INTERVAL: 428 MS
QT INTERVAL: 439 MS
QTC INTERVAL: 427 MS
QTC INTERVAL: 461 MS
SODIUM SERPL-SCNC: 141 MMOL/L (ref 136–145)
TROPONIN T DELTA: -1 NG/L

## 2024-11-10 PROCEDURE — 80053 COMPREHEN METABOLIC PANEL: CPT | Performed by: NURSE PRACTITIONER

## 2024-11-10 PROCEDURE — 99223 1ST HOSP IP/OBS HIGH 75: CPT | Mod: FS | Performed by: NURSE PRACTITIONER

## 2024-11-10 PROCEDURE — 25010000002 METRONIDAZOLE 500 MG/100ML SOLUTION: Performed by: STUDENT IN AN ORGANIZED HEALTH CARE EDUCATION/TRAINING PROGRAM

## 2024-11-10 PROCEDURE — 25010000002 AMPICILLIN-SULBACTAM PER 1.5 G

## 2024-11-10 PROCEDURE — 25010000002 KETOROLAC TROMETHAMINE PER 15 MG: Performed by: NURSE PRACTITIONER

## 2024-11-10 PROCEDURE — 83605 ASSAY OF LACTIC ACID: CPT

## 2024-11-10 PROCEDURE — 84484 ASSAY OF TROPONIN QUANT: CPT | Performed by: NURSE PRACTITIONER

## 2024-11-10 PROCEDURE — 25810000003 LACTATED RINGERS PER 1000 ML: Performed by: STUDENT IN AN ORGANIZED HEALTH CARE EDUCATION/TRAINING PROGRAM

## 2024-11-10 PROCEDURE — 25010000002 CEFTRIAXONE PER 250 MG: Performed by: STUDENT IN AN ORGANIZED HEALTH CARE EDUCATION/TRAINING PROGRAM

## 2024-11-10 PROCEDURE — 87040 BLOOD CULTURE FOR BACTERIA: CPT

## 2024-11-10 PROCEDURE — 25010000002 HYDROMORPHONE PER 4 MG: Performed by: NURSE PRACTITIONER

## 2024-11-10 RX ORDER — SODIUM CHLORIDE, SODIUM LACTATE, POTASSIUM CHLORIDE, CALCIUM CHLORIDE 600; 310; 30; 20 MG/100ML; MG/100ML; MG/100ML; MG/100ML
100 INJECTION, SOLUTION INTRAVENOUS CONTINUOUS
Status: DISCONTINUED | OUTPATIENT
Start: 2024-11-10 | End: 2024-11-10

## 2024-11-10 RX ORDER — SODIUM CHLORIDE, SODIUM LACTATE, POTASSIUM CHLORIDE, CALCIUM CHLORIDE 600; 310; 30; 20 MG/100ML; MG/100ML; MG/100ML; MG/100ML
100 INJECTION, SOLUTION INTRAVENOUS CONTINUOUS
Status: DISPENSED | OUTPATIENT
Start: 2024-11-10 | End: 2024-11-11

## 2024-11-10 RX ORDER — METRONIDAZOLE 500 MG/100ML
500 INJECTION, SOLUTION INTRAVENOUS EVERY 8 HOURS
Status: DISCONTINUED | OUTPATIENT
Start: 2024-11-10 | End: 2024-11-10

## 2024-11-10 RX ORDER — METRONIDAZOLE 500 MG/100ML
500 INJECTION, SOLUTION INTRAVENOUS EVERY 8 HOURS
Status: DISCONTINUED | OUTPATIENT
Start: 2024-11-10 | End: 2024-11-12

## 2024-11-10 RX ADMIN — AMPICILLIN SODIUM AND SULBACTAM SODIUM 3 G: 2; 1 INJECTION, POWDER, FOR SOLUTION INTRAMUSCULAR; INTRAVENOUS at 01:47

## 2024-11-10 RX ADMIN — HYDROMORPHONE HYDROCHLORIDE 0.5 MG: 1 INJECTION, SOLUTION INTRAMUSCULAR; INTRAVENOUS; SUBCUTANEOUS at 08:20

## 2024-11-10 RX ADMIN — HYDROMORPHONE HYDROCHLORIDE 0.5 MG: 1 INJECTION, SOLUTION INTRAMUSCULAR; INTRAVENOUS; SUBCUTANEOUS at 20:06

## 2024-11-10 RX ADMIN — SODIUM CHLORIDE, POTASSIUM CHLORIDE, SODIUM LACTATE AND CALCIUM CHLORIDE 100 ML/HR: 600; 310; 30; 20 INJECTION, SOLUTION INTRAVENOUS at 10:22

## 2024-11-10 RX ADMIN — HYDROMORPHONE HYDROCHLORIDE 0.5 MG: 1 INJECTION, SOLUTION INTRAMUSCULAR; INTRAVENOUS; SUBCUTANEOUS at 14:38

## 2024-11-10 RX ADMIN — METRONIDAZOLE 500 MG: 500 INJECTION, SOLUTION INTRAVENOUS at 20:05

## 2024-11-10 RX ADMIN — PANTOPRAZOLE SODIUM 40 MG: 40 INJECTION, POWDER, FOR SOLUTION INTRAVENOUS at 08:02

## 2024-11-10 RX ADMIN — HYDROMORPHONE HYDROCHLORIDE 0.5 MG: 1 INJECTION, SOLUTION INTRAMUSCULAR; INTRAVENOUS; SUBCUTANEOUS at 16:30

## 2024-11-10 RX ADMIN — CEFTRIAXONE 1000 MG: 1 INJECTION, POWDER, FOR SOLUTION INTRAMUSCULAR; INTRAVENOUS at 12:22

## 2024-11-10 RX ADMIN — KETOROLAC TROMETHAMINE 15 MG: 30 INJECTION, SOLUTION INTRAMUSCULAR at 08:02

## 2024-11-10 RX ADMIN — METRONIDAZOLE 500 MG: 500 INJECTION, SOLUTION INTRAVENOUS at 12:22

## 2024-11-10 RX ADMIN — SODIUM CHLORIDE, POTASSIUM CHLORIDE, SODIUM LACTATE AND CALCIUM CHLORIDE 100 ML/HR: 600; 310; 30; 20 INJECTION, SOLUTION INTRAVENOUS at 08:02

## 2024-11-10 RX ADMIN — Medication 10 ML: at 20:14

## 2024-11-10 RX ADMIN — HYDROMORPHONE HYDROCHLORIDE 0.5 MG: 1 INJECTION, SOLUTION INTRAMUSCULAR; INTRAVENOUS; SUBCUTANEOUS at 23:01

## 2024-11-10 RX ADMIN — Medication 10 ML: at 00:41

## 2024-11-10 RX ADMIN — HYDROMORPHONE HYDROCHLORIDE 0.5 MG: 1 INJECTION, SOLUTION INTRAMUSCULAR; INTRAVENOUS; SUBCUTANEOUS at 12:22

## 2024-11-10 RX ADMIN — SODIUM CHLORIDE, POTASSIUM CHLORIDE, SODIUM LACTATE AND CALCIUM CHLORIDE 100 ML/HR: 600; 310; 30; 20 INJECTION, SOLUTION INTRAVENOUS at 19:09

## 2024-11-10 NOTE — CONSULTS
GI CONSULT  NOTE:    Referring Provider:     Shan     Chief complaint:    CBD 1.7, abdominal pain, pending MRI    Subjective    Epigastric pain    History of present illness:     Patient 74-year-old male with past medical history of GERD who presented to [facility] ER on 11/9/2024 for epigastric pain.  Patient underwent CT of the abdomen pelvis with contrast which showed dilated CBD of 1.7 cm, mild intrahepatic bile duct dilation.  Gallbladder somewhat prominent but no Trevon cholecystic stranding.  No focal liver lesions.  LFTs were normal.  GI was consulted for dilated CBD.    Patient states he has had this pain previously.  Patient has been currently this pain for about 3 weeks.  Has tried Pepto-Bismol with some relief.  Patient had nausea and radiation to his back.    LABS: WBCs 11.07, hemoglobin 14.5, platelets 162.  Lipase 27.  Sodium potassium are normal creatinine 0.82.  LFTs are normal.  Pending MRI    Endo History:  2021 colonoscopy normal mucosa, bx neg, hp polyp  No record of EGD in the past.    Past Medical History:  History reviewed. No pertinent past medical history.    Past Surgical History:  History reviewed. No pertinent surgical history.    Social History:  Social History     Tobacco Use    Smoking status: Never    Smokeless tobacco: Never   Vaping Use    Vaping status: Never Used   Substance Use Topics    Alcohol use: Never    Drug use: Never       Family History:  History reviewed. No pertinent family history.    Medications:  Medications Prior to Admission   Medication Sig Dispense Refill Last Dose/Taking    Omega-3 Fatty Acids (FISH OIL PO) Take  by mouth Daily.   11/8/2024    pantoprazole (PROTONIX) 40 MG EC tablet Take 1 tablet by mouth Daily.   11/8/2024       Scheduled Meds:cefTRIAXone, 1,000 mg, Intravenous, Q24H  metroNIDAZOLE, 500 mg, Intravenous, Q8H  pantoprazole, 40 mg, Intravenous, Q AM  sodium chloride, 10 mL, Intravenous, Q12H      Continuous Infusions:lactated  "ringers, 100 mL/hr, Last Rate: 100 mL/hr (11/10/24 1022)      PRN Meds:.  HYDROmorphone **AND** naloxone    ketorolac    Magnesium Standard Dose Replacement - Follow Nurse / BPA Driven Protocol    ondansetron    Phosphorus Replacement - Follow Nurse / BPA Driven Protocol    Potassium Replacement - Follow Nurse / BPA Driven Protocol    sodium chloride    sodium chloride    ALLERGIES:  Patient has no known allergies.    ROS:  Review of Systems   Gastrointestinal:  Positive for abdominal pain and nausea.   Musculoskeletal:  Positive for back pain.       The following systems were reviewed and negative;    Constitution:  No fevers, chills, no unintentional weight loss  Skin: no rash, no jaundice  Eyes:  No blurry vision, no eye pain  HENT:  No change in hearing or smell  Resp:  No dyspnea or cough  CV:  No chest pain or palpitations  :  No dysuria, hematuria  Musculoskeletal:  No leg cramps or arthralgias  Neuro:  No tremor, no numbness  Psych:  No depression or confusion    Objective resting in hospital bed.  NAD.  Room 2119.    Vital Signs:   Vitals:    11/09/24 2153 11/10/24 0135 11/10/24 0500 11/10/24 1013   BP: 148/87 123/79 140/98 129/81   BP Location: Right arm Right arm Right arm Right arm   Patient Position: Lying Lying Lying Lying   Pulse: 80 71 68    Resp: 18 18 18 18   Temp: 97.8 °F (36.6 °C) 99.1 °F (37.3 °C) 98.7 °F (37.1 °C) 97.6 °F (36.4 °C)   TempSrc: Oral Oral Oral Oral   SpO2: 96% 95% 93%    Weight: 70 kg (154 lb 5.2 oz)  70 kg (154 lb 5.2 oz)    Height: 170.2 cm (67.01\")          Physical Exam:   General Appearance:    Awake and alert, in no acute distress   Head:    Normocephalic, without obvious abnormality, atraumatic   Eyes:            Conjunctivae normal, anicteric sclerae, pupils equal   Ears:    Ears appear intact with no abnormalities noted   Throat:   No oral lesions, no thrush, oral mucosa moist   Neck:   Supple, no JVD   Lungs:       respirations regular, even and unlabored        Chest " "Wall:    No abnormalities observed   Abdomen:     soft, tender, no rebound or guarding, nondistended, no hepatosplenomegaly   Rectal:     Deferred   Extremities:   Moves all extremities, no edema, no cyanosis   Pulses:   Pulses palpable and equal bilaterally   Skin:   No rash, no jaundice, normal palpation        Neurologic:   Cranial nerves 2 - 12 grossly intact, no asterixis       Results Review:   I reviewed the patient's labs and imaging.  CBC    Results from last 7 days   Lab Units 11/09/24 2222 11/09/24  1707   WBC 10*3/mm3 11.07* 8.20   HEMOGLOBIN g/dL 14.5 15.4   PLATELETS 10*3/mm3 162 181     CMP   Results from last 7 days   Lab Units 11/09/24 2222 11/09/24  1707   SODIUM mmol/L 143 140   POTASSIUM mmol/L 3.8 3.3*   CHLORIDE mmol/L 105 102   CO2 mmol/L 26.4 27.0   BUN mg/dL 15 16   CREATININE mg/dL 0.82 0.85   GLUCOSE mg/dL 119* 97   ALBUMIN g/dL  --  4.5   BILIRUBIN mg/dL  --  0.4   ALK PHOS U/L  --  75   AST (SGOT) U/L  --  33   ALT (SGPT) U/L  --  26   MAGNESIUM mg/dL 1.9  --    LIPASE U/L  --  27     Cr Clearance Estimated Creatinine Clearance: 75.9 mL/min (by C-G formula based on SCr of 0.82 mg/dL).  Coag     HbA1C No results found for: \"HGBA1C\"  Blood Glucose No results found for: \"POCGLU\"  Infection     UA    Results from last 7 days   Lab Units 11/09/24  1948   NITRITE UA  Negative   WBC UA /HPF 0-2   BACTERIA UA /HPF None Seen   SQUAM EPITHEL UA /HPF None Seen     Radiology(recent) XR Chest 1 View    Result Date: 11/9/2024  Impression: Mild peripheral reticular interstitial changes likely representing chronic interstitial lung disease Electronically Signed: Juan J Gonzáles  11/9/2024 6:29 PM EST  Workstation ID: [id number]    CT Abdomen Pelvis With Contrast    Result Date: 11/9/2024  There is dilatation of the common bile duct with mild intrahepatic biliary duct dilatation and prominence of the gallbladder. Correlate with any clinical findings of biliary obstruction. MRCP might be considered to " assess for an obstructing distal common  duct lesion if warranted. No other acute abnormality suggested on this exam Electronically Signed: Juan J Nivia  11/9/2024 6:28 PM EST  Workstation ID: [id number]       ASSESSMENT:  Epigastric pain  Abnormal CT showing dilated CBD 1.7 mm with normal LFTs  Hypertension    PLAN:  Repeat CMP now.  Will contact radiology to get a stat read on the MRI as it was done at 11 PM last night.  Supportive care in the interim.  Continue Rocephin and Flagyl for now.  Continue IV PPI       I discussed the patient's findings and my recommendations with the patient.  Bushra Guerra, APRN  11/10/24  12:21 EST    Time:

## 2024-11-10 NOTE — NURSING NOTE
Dr. Snyder called and notified RN that patients MRI is showing an obstructing stone in common bile duct. GI NP Bushra Guerra made aware. Patient notified of continued strict NPO status.

## 2024-11-10 NOTE — NURSING NOTE
Patient just returned from MRI. Tele pack re applied, new 02 sensor in place, iv fluids running, call light in reach. No signs or symptoms of distress noted, no current complaints of pain, urinal in reach.

## 2024-11-10 NOTE — H&P
[facility] Medicine Services  History & Physical    Patient Name: Teddy Vega  : 1950  MRN: [id number]  Primary Care Physician:  Facundo Root MD  Date of admission: 2024  Date and Time of Service: 2024 at 2210    Subjective      Chief Complaint: epigastric pain    History of Present Illness: Teddy Vega is a 74 y.o. male with a CMH of GERD, HTN, HLD, splenectomy, former smoker who presented to [facility] on 2024 with epigastric pain. Lives at home, independent with ADLs.   Presented to free standing ED for epigastric pain. States 3 weeks ago with episode epigastric pain relieved with 3 doses of Pepto. Yesterday epigastric pain returned, self resolved after 30 minutes. Today, epigastric pain returned with nausea and radiation into back. Denies vomiting, diarrhea, fever. On arrival to ED VS: 97.8-62-/120-98% room air. Rated pain 9/10.     Upon evaluation, awake, alert, resting in bed. Current VS: 97.8-80-/87-96% room air. Rates current pain 1/10. Denies nausea at this time. Endorses compliance with daily Protonix. Last EGD per patient was several years ago. On exam with epigastric and RUQ tenderness without guarding or rebound tenderness.   Denies changes in stool color or consistency.   EKG revealed SR, IVCD, rate 66. . Qtc 461  CXR chronic interstitial lung disease, no acute findings.   CT abd/pelvis revealed dilatation of common bile duct with mild intrahepatic biliary duct dilatation and prominence of gall bladder.   Blood work reveals WBC 8.2 repeat 11.07, Hgb 15.4, Hct 46.2, unremarkable BMP, magnesium 1.9.   Urinalysis reveals 15 ketones, trace blood,       Review of Systems   Constitutional:  Negative for chills and fever.   Respiratory:  Negative for shortness of breath.    Cardiovascular:  Negative for chest pain.   Gastrointestinal:  Positive for abdominal pain and nausea. Negative for diarrhea and vomiting.   Genitourinary:   Negative for dysuria.       Personal History     History reviewed. No pertinent past medical history.    History reviewed. No pertinent surgical history.    Family History: family history is not on file. Otherwise pertinent FHx was reviewed and not pertinent to current issue.    Social History:  reports that he has never smoked. He has never used smokeless tobacco. He reports that he does not drink alcohol and does not use drugs.    Home Medications:  Prior to Admission Medications       Prescriptions Last Dose Informant Patient Reported? Taking?    Omega-3 Fatty Acids (FISH OIL PO) 11/8/2024  Yes Yes    Take  by mouth Daily.    pantoprazole (PROTONIX) 40 MG EC tablet 11/8/2024  Yes Yes    Take 1 tablet by mouth Daily.              Allergies:  No Known Allergies    Objective      Vitals:   Temp:  [97.8 °F (36.6 °C)-97.9 °F (36.6 °C)] 97.8 °F (36.6 °C)  Heart Rate:  [60-80] 80  Resp:  [18] 18  BP: (136-210)/() 148/87  Body mass index is 24.16 kg/m².  Physical Exam  Constitutional:       Appearance: Normal appearance.   HENT:      Head: Normocephalic and atraumatic.      Mouth/Throat:      Mouth: Mucous membranes are moist.   Eyes:      General: No scleral icterus.     Extraocular Movements: Extraocular movements intact.      Pupils: Pupils are equal, round, and reactive to light.   Cardiovascular:      Rate and Rhythm: Normal rate and regular rhythm.      Pulses: Normal pulses.      Heart sounds: Normal heart sounds.   Pulmonary:      Effort: Pulmonary effort is normal.      Breath sounds: Normal breath sounds.   Abdominal:      General: Bowel sounds are normal. There is no distension.      Palpations: Abdomen is soft.      Tenderness: There is abdominal tenderness.      Comments: Epigastric and RUQ tenderness without guarding.    Musculoskeletal:      Right lower leg: No edema.      Left lower leg: No edema.   Skin:     General: Skin is warm and dry.   Neurological:      Mental Status: He is alert and oriented  to person, place, and time. Mental status is at baseline.         Diagnostic Data:  Lab Results (last 24 hours)       Procedure Component Value Units Date/Time    Basic Metabolic Panel [[id number]]  (Abnormal) Collected: 11/09/24 2222    Specimen: Blood Updated: 11/09/24 2253     Glucose 119 mg/dL      BUN 15 mg/dL      Creatinine 0.82 mg/dL      Sodium 143 mmol/L      Potassium 3.8 mmol/L      Comment: Specimen hemolyzed.  Result may be falsely elevated.        Chloride 105 mmol/L      CO2 26.4 mmol/L      Calcium 9.0 mg/dL      BUN/Creatinine Ratio 18.3     Anion Gap 11.6 mmol/L      eGFR 91.0 mL/min/1.73     Narrative:      GFR Normal >60  Chronic Kidney Disease <60  Kidney Failure <15    The GFR formula is only valid for adults with stable renal function between ages 18 and 70.    Magnesium [[id number]]  (Normal) Collected: 11/09/24 2222    Specimen: Blood Updated: 11/09/24 2253     Magnesium 1.9 mg/dL     CBC & Differential [[id number]]  (Abnormal) Collected: 11/09/24 2222    Specimen: Blood Updated: 11/09/24 2233    Narrative:      The following orders were created for panel order CBC & Differential.  Procedure                               Abnormality         Status                     ---------                               -----------         ------                     CBC Auto Differential[[id number]]        Abnormal            Final result                 Please view results for these tests on the individual orders.    CBC Auto Differential [[id number]]  (Abnormal) Collected: 11/09/24 2222    Specimen: Blood Updated: 11/09/24 2233     WBC 11.07 10*3/mm3      RBC 4.61 10*6/mm3      Hemoglobin 14.5 g/dL      Hematocrit 43.7 %      MCV 94.8 fL      MCH 31.5 pg      MCHC 33.2 g/dL      RDW 12.7 %      RDW-SD 44.0 fl      MPV 10.9 fL      Platelets 162 10*3/mm3      Neutrophil % 89.6 %      Lymphocyte % 5.4 %      Monocyte % 4.4 %      Eosinophil % 0.2 %      Basophil % 0.1 %      Immature Grans % 0.3 %       Neutrophils, Absolute 9.92 10*3/mm3      Lymphocytes, Absolute 0.60 10*3/mm3      Monocytes, Absolute 0.49 10*3/mm3      Eosinophils, Absolute 0.02 10*3/mm3      Basophils, Absolute 0.01 10*3/mm3      Immature Grans, Absolute 0.03 10*3/mm3      nRBC 0.0 /100 WBC     Urinalysis, Microscopic Only - Urine, Clean Catch [[id number]] Collected: 11/1950    Specimen: Urine, Clean Catch Updated: 11/09/24 2140     RBC, UA 0-2 /HPF      WBC, UA 0-2 /HPF      Bacteria, UA None Seen /HPF      Squamous Epithelial Cells, UA None Seen /HPF      Hyaline Casts, UA 0-2 /LPF      Granular Casts, UA 0-2 /LPF      Methodology Automated Microscopy    Urinalysis With Microscopic If Indicated (No Culture) - Urine, Clean Catch [[id number]]  (Abnormal) Collected: 11/1950    Specimen: Urine, Clean Catch Updated: 11/09/24 1952     Color, UA Yellow     Appearance, UA Clear     pH, UA 6.0     Specific Gravity, UA 1.010     Glucose, UA Negative     Ketones, UA 15 mg/dL (1+)     Bilirubin, UA Negative     Blood, UA Trace     Protein, UA Negative     Leuk Esterase, UA Negative     Nitrite, UA Negative     Urobilinogen, UA 0.2 E.U./dL    Vega Urine Culture Tube - Urine, Clean Catch [[id number]] Collected: 11/1950    Specimen: Urine, Clean Catch Updated: 11/09/24 1952    Lipase [[id number]]  (Normal) Collected: 11/09/24 1707    Specimen: Blood Updated: 11/09/24 1733     Lipase 27 U/L     Comprehensive Metabolic Panel [[id number]]  (Abnormal) Collected: 11/09/24 1707    Specimen: Blood Updated: 11/09/24 1733     Glucose 97 mg/dL      BUN 16 mg/dL      Creatinine 0.85 mg/dL      Sodium 140 mmol/L      Potassium 3.3 mmol/L      Chloride 102 mmol/L      CO2 27.0 mmol/L      Calcium 9.3 mg/dL      Total Protein 7.4 g/dL      Albumin 4.5 g/dL      ALT (SGPT) 26 U/L      AST (SGOT) 33 U/L      Alkaline Phosphatase 75 U/L      Total Bilirubin 0.4 mg/dL      Globulin 2.9 gm/dL      A/G Ratio 1.6 g/dL      BUN/Creatinine Ratio 18.8     Anion Gap  11.0 mmol/L      eGFR 90.1 mL/min/1.73     Narrative:      GFR Normal >60  Chronic Kidney Disease <60  Kidney Failure <15    The GFR formula is only valid for adults with stable renal function between ages 18 and 70.    Single High Sensitivity Troponin T [[id number]]  (Normal) Collected: 11/09/24 1707    Specimen: Blood Updated: 11/09/24 1733     HS Troponin T 13 ng/L     Narrative:      High Sensitive Troponin T Reference Range:  <14.0 ng/L- Negative Female for AMI  <22.0 ng/L- Negative Male for AMI  >=14 - Abnormal Female indicating possible myocardial injury.  >=22 - Abnormal Male indicating possible myocardial injury.   Clinicians would have to utilize clinical acumen, EKG, Troponin, and serial changes to determine if it is an Acute Myocardial Infarction or myocardial injury due to an underlying chronic condition.         CBC & Differential [[id number]]  (Normal) Collected: 11/09/24 1707    Specimen: Blood Updated: 11/09/24 1711    Narrative:      The following orders were created for panel order CBC & Differential.  Procedure                               Abnormality         Status                     ---------                               -----------         ------                     CBC Auto Differential[[id number]]        Normal              Final result                 Please view results for these tests on the individual orders.    CBC Auto Differential [[id number]]  (Normal) Collected: 11/09/24 1707    Specimen: Blood Updated: 11/09/24 1711     WBC 8.20 10*3/mm3      RBC 4.94 10*6/mm3      Hemoglobin 15.4 g/dL      Hematocrit 46.2 %      MCV 93.5 fL      MCH 31.2 pg      MCHC 33.3 g/dL      RDW 12.6 %      RDW-SD 43.5 fl      MPV 11.3 fL      Platelets 181 10*3/mm3      Neutrophil % 58.5 %      Lymphocyte % 27.1 %      Monocyte % 10.6 %      Eosinophil % 3.3 %      Basophil % 0.4 %      Immature Grans % 0.1 %      Neutrophils, Absolute 4.80 10*3/mm3      Lymphocytes, Absolute 2.22 10*3/mm3      Monocytes,  Absolute 0.87 10*3/mm3      Eosinophils, Absolute 0.27 10*3/mm3      Basophils, Absolute 0.03 10*3/mm3      Immature Grans, Absolute 0.01 10*3/mm3              Imaging Results (Last 24 Hours)       Procedure Component Value Units Date/Time    XR Chest 1 View [[id number]] Collected: 11/09/24 1828     Updated: 11/09/24 1831    Narrative:      XR CHEST 1 VW    Date of Exam: 11/9/2024 5:45 PM EST    Indication: Upper Abdominal Pain triage protocol    Comparison: None available.    Findings:  Heart size and pulmonary vasculature within normal limits. Thoracic aorta tortuous mild increased interstitial markings in the peripheral lung zones. No airspace consolidation. No free subdiaphragmatic air      Impression:      Impression:  Mild peripheral reticular interstitial changes likely representing chronic interstitial lung disease      Electronically Signed: Juan J Gonzáles    11/9/2024 6:29 PM EST    Workstation ID: [id number]    CT Abdomen Pelvis With Contrast [[id number]] Collected: 11/09/24 1823     Updated: 11/09/24 1830    Narrative:      CT ABDOMEN PELVIS W CONTRAST    Date of Exam: 11/9/2024 5:45 PM EST    Indication: epigastric.    Comparison: None available.    Technique: Axial CT images were obtained of the abdomen and pelvis following the uneventful intravenous administration of iodinated contrast. Sagittal and coronal reconstructions were performed.  Automated exposure control and iterative reconstruction   methods were used.        Findings:    Lower Chest: Mild fibrosis in the lung bases. Left pleural calcification. Bilateral fat-containing Bochdalek hernias    Organs: Common duct is dilated measuring up to 1.7 cm, and is visualized to the ampulla. There is mild intrahepatic biliary duct dilatation more notably in the left lobe. Gallbladder is somewhat prominent with no pericholecystic stranding. No focal liver   lesion is demonstrated. Spleen, pancreas, kidneys, adrenal glands unremarkable other than renal cysts.  No peripancreatic inflammatory stranding    Gastrointestinal: No intestinal distention or evident wall thickening. Normal appendix    Pelvis: No abnormal fluid collection. Urinary bladder unremarkable    Peritoneum/Retroperitoneum: No ascites or pneumoperitoneum. Normal caliber aorta    Bones/Soft Tissues: No acute bony abnormality      Impression:      There is dilatation of the common bile duct with mild intrahepatic biliary duct dilatation and prominence of the gallbladder. Correlate with any clinical findings of biliary obstruction. MRCP might be considered to assess for an obstructing distal common   duct lesion if warranted. No other acute abnormality suggested on this exam                    Electronically Signed: Juan J Gonzáles    11/9/2024 6:28 PM EST    Workstation ID: [id number]              Assessment & Plan        This is a 74 y.o. male with PMH of GERD, HTN, HLD, splenectomy, former smoker who presented with epigastric pain and nausea. ED work up revealed dilatation of common bile duct with mild intrahepatic biliary duct dilatation and prominence of gall bladder. Will be admitted for further workup/treatment of epigastric pain with possible biliary obstruction.         Active and Resolved Problems  Active Hospital Problems    Diagnosis  POA    **Epigastric pain [R10.13]  Yes      Resolved Hospital Problems   No resolved problems to display.     Epigastric pain:  -presents with epigastric pain progressive over past 3 weeks. Today with nausea and radiation into back.   -continue PPI  -pain control  -antiemetics  -CT: dilatation of common bile duct wth mild intrahepatic biliary duct dilatation and prominence of gall bladder.  -obtain MRCP.   -consider general surgery consult pending clinical course.   -consider cardiac source, troponin x 2 negative. EKG: no acute findings.     Hypertension:  -arrival /120, currently 148/87  -BP may be labile with pain response.   -trend BP  -does not take home  antihypertensive medications.     GERD:  -Protonix for GI Ppx.         VTE Prophylaxis:  Mechanical VTE prophylaxis orders are present.        The patient desires to be as follows:    CODE STATUS:    Level Of Support Discussed With: Patient  Code Status (Patient has no pulse and is not breathing): CPR (Attempt to Resuscitate)  Medical Interventions (Patient has pulse or is breathing): Full Support        Admission Status:  I believe this patient meets IP status.    Expected Length of Stay: 3 days    PDMP and Medication Dispenses via Sidebar reviewed and consistent with patient reported medications.    I discussed the patient's findings and my recommendations with patient and nursing staff.      Signature:     This document has been electronically signed by AUSTIN Guthrie on November 9, 2024 23:00 [facility]

## 2024-11-10 NOTE — NURSING NOTE
Patient completed MRI check list per request of MRI tech secondary to MRI ordered by provider. MRI tech arrived on unit to transport patient down to MRI via wheel chair. Patient transferred to wheel chair without incident and left unit alert and oriented with no complaints of pain and no signs or symptoms of acute distress.

## 2024-11-10 NOTE — PROGRESS NOTES
[facility] MEDICINE SERVICE  DAILY PROGRESS NOTE    NAME: Teddy Vega  : 1950  MRN: [id number]      LOS: 1 day     PROVIDER OF SERVICE: López Torrez MD    Chief Complaint: Epigastric pain    Subjective:     Interval History:  History taken from: patient  No acute overnight events. Patient reports RUQ abd pain, denies emesis, or any signs of overt GI bleed at this time. Denies chest pain, SOB, fever or chills, currently on RA.     Review of Systems:   Review of Systems    Objective:     Vital Signs  Temp:  [97.6 °F (36.4 °C)-99.1 °F (37.3 °C)] 97.6 °F (36.4 °C)  Heart Rate:  [60-80] 68  Resp:  [18] 18  BP: (123-210)/() 129/81   Body mass index is 24.16 kg/m².    Physical Exam  General Appearance:  Awake alert   Head:  Atraumatic normocephalic   Eyes:        No sclera icterus   Neck: Normal ROM   Pulm: Clear to auscultation   Cardio: HR normal, rhythm regular   Extremities: No remarkable edema on ble   Abdomen: RUQ tenderness, no rebound, soft, non distended       Musculoskeletal: Moves all extremities   Neuro: Aaox3               Scheduled Meds   cefTRIAXone, 1,000 mg, Intravenous, Q24H  metroNIDAZOLE, 500 mg, Intravenous, Q8H  pantoprazole, 40 mg, Intravenous, Q AM  sodium chloride, 10 mL, Intravenous, Q12H       PRN Meds     HYDROmorphone **AND** naloxone    ketorolac    Magnesium Standard Dose Replacement - Follow Nurse / BPA Driven Protocol    ondansetron    Phosphorus Replacement - Follow Nurse / BPA Driven Protocol    Potassium Replacement - Follow Nurse / BPA Driven Protocol    sodium chloride    sodium chloride   Infusions  lactated ringers, 100 mL/hr, Last Rate: 100 mL/hr (11/10/24 1022)          Diagnostic Data    Results from last 7 days   Lab Units 24  2222 24  1707   WBC 10*3/mm3 11.07* 8.20   HEMOGLOBIN g/dL 14.5 15.4   HEMATOCRIT % 43.7 46.2   PLATELETS 10*3/mm3 162 181   GLUCOSE mg/dL 119* 97   CREATININE mg/dL 0.82 0.85   BUN mg/dL 15 16   SODIUM  mmol/L 143 140   POTASSIUM mmol/L 3.8 3.3*   AST (SGOT) U/L  --  33   ALT (SGPT) U/L  --  26   ALK PHOS U/L  --  75   BILIRUBIN mg/dL  --  0.4   ANION GAP mmol/L 11.6 11.0       XR Chest 1 View    Result Date: 11/9/2024  Impression: Mild peripheral reticular interstitial changes likely representing chronic interstitial lung disease Electronically Signed: Juan J Gonzáles  11/9/2024 6:29 PM EST  Workstation ID: [id number]    CT Abdomen Pelvis With Contrast    Result Date: 11/9/2024  There is dilatation of the common bile duct with mild intrahepatic biliary duct dilatation and prominence of the gallbladder. Correlate with any clinical findings of biliary obstruction. MRCP might be considered to assess for an obstructing distal common  duct lesion if warranted. No other acute abnormality suggested on this exam Electronically Signed: Juan J Gonzáles  11/9/2024 6:28 PM EST  Workstation ID: [id number]       I reviewed the patient's new clinical results.    Assessment/Plan:     Active and Resolved Problems  Active Hospital Problems    Diagnosis  POA    **Epigastric pain [R10.13]  Yes      Resolved Hospital Problems   No resolved problems to display.       Plan:  -CTAP showing CBD dilatation of 1.7 cm, MRI pending, Consult GI for further eval   -Continue IV fluids, change abx from unasyn to ceftriaxone/Flagyl, if MRI not concerning for infection, will deescalate or stop abx if MRI neg for infection, blood cx pending  -Continie PPI daily for now, Pain control pRN meds  -BP stable for now   -Follow am labs    Addendum:   -MRI concerning for acute cholecystitis, filling defect noted in CBD, GI is aware, plan for ERCP, continue abx, and consult surgery as well for possible cholecystectomy   -AM Labs    VTE Prophylaxis:  Mechanical VTE prophylaxis orders are present.         Code status is   Code Status and Medical Interventions: CPR (Attempt to Resuscitate); Full Support   Ordered at: 11/09/24 2300     Level Of Support Discussed With:     Patient     Code Status (Patient has no pulse and is not breathing):    CPR (Attempt to Resuscitate)     Medical Interventions (Patient has pulse or is breathing):    Full Support       Plan for disposition:MRI pending, GI reccs, am labs    Time: 30 minutes    Part of this note may be an electronic transcription/translation of spoken language to printed text using the Dragon Dictation System.    Signature: Electronically signed by López Torrez MD, 11/10/24, 11:32 EST.  [facility] Hospitalist Team

## 2024-11-11 ENCOUNTER — INPATIENT HOSPITAL (OUTPATIENT)
Age: 74
End: 2024-11-11
Payer: COMMERCIAL

## 2024-11-11 ENCOUNTER — INPATIENT HOSPITAL (OUTPATIENT)
Dept: URBAN - METROPOLITAN AREA HOSPITAL 84 | Facility: HOSPITAL | Age: 74
End: 2024-11-11
Payer: COMMERCIAL

## 2024-11-11 ENCOUNTER — APPOINTMENT (OUTPATIENT)
Dept: GENERAL RADIOLOGY | Facility: HOSPITAL | Age: 74
End: 2024-11-11
Payer: MEDICARE

## 2024-11-11 DIAGNOSIS — K80.50 CALCULUS OF BILE DUCT WITHOUT CHOLANGITIS OR CHOLECYSTITIS W: ICD-10-CM

## 2024-11-11 LAB
ALBUMIN SERPL-MCNC: 4.1 G/DL (ref 3.5–5.2)
ALBUMIN/GLOB SERPL: 1.5 G/DL
ALP SERPL-CCNC: 282 U/L (ref 39–117)
ALT SERPL W P-5'-P-CCNC: 174 U/L (ref 1–41)
ANION GAP SERPL CALCULATED.3IONS-SCNC: 11.4 MMOL/L (ref 5–15)
AST SERPL-CCNC: 168 U/L (ref 1–40)
BASOPHILS # BLD AUTO: 0.03 10*3/MM3 (ref 0–0.2)
BASOPHILS NFR BLD AUTO: 0.3 % (ref 0–1.5)
BILIRUB SERPL-MCNC: 6.3 MG/DL (ref 0–1.2)
BUN SERPL-MCNC: 15 MG/DL (ref 8–23)
BUN/CREAT SERPL: 14.7 (ref 7–25)
CALCIUM SPEC-SCNC: 8.8 MG/DL (ref 8.6–10.5)
CHLORIDE SERPL-SCNC: 102 MMOL/L (ref 98–107)
CO2 SERPL-SCNC: 26.6 MMOL/L (ref 22–29)
CREAT SERPL-MCNC: 1.02 MG/DL (ref 0.76–1.27)
CRP SERPL-MCNC: 9.6 MG/DL (ref 0–0.5)
DEPRECATED RDW RBC AUTO: 46.7 FL (ref 37–54)
EGFRCR SERPLBLD CKD-EPI 2021: 76.2 ML/MIN/1.73
EOSINOPHIL # BLD AUTO: 0.34 10*3/MM3 (ref 0–0.4)
EOSINOPHIL NFR BLD AUTO: 3.3 % (ref 0.3–6.2)
ERYTHROCYTE [DISTWIDTH] IN BLOOD BY AUTOMATED COUNT: 13.1 % (ref 12.3–15.4)
GLOBULIN UR ELPH-MCNC: 2.7 GM/DL
GLUCOSE SERPL-MCNC: 105 MG/DL (ref 65–99)
HCT VFR BLD AUTO: 43.8 % (ref 37.5–51)
HGB BLD-MCNC: 14.4 G/DL (ref 13–17.7)
IMM GRANULOCYTES # BLD AUTO: 0.04 10*3/MM3 (ref 0–0.05)
IMM GRANULOCYTES NFR BLD AUTO: 0.4 % (ref 0–0.5)
INR PPP: 1.21 (ref 0.9–1.1)
LIPASE SERPL-CCNC: 22 U/L (ref 13–60)
LYMPHOCYTES # BLD AUTO: 1.32 10*3/MM3 (ref 0.7–3.1)
LYMPHOCYTES NFR BLD AUTO: 12.9 % (ref 19.6–45.3)
MAGNESIUM SERPL-MCNC: 1.8 MG/DL (ref 1.6–2.4)
MCH RBC QN AUTO: 31.6 PG (ref 26.6–33)
MCHC RBC AUTO-ENTMCNC: 32.9 G/DL (ref 31.5–35.7)
MCV RBC AUTO: 96.3 FL (ref 79–97)
MONOCYTES # BLD AUTO: 1.1 10*3/MM3 (ref 0.1–0.9)
MONOCYTES NFR BLD AUTO: 10.7 % (ref 5–12)
NEUTROPHILS NFR BLD AUTO: 7.43 10*3/MM3 (ref 1.7–7)
NEUTROPHILS NFR BLD AUTO: 72.4 % (ref 42.7–76)
NRBC BLD AUTO-RTO: 0 /100 WBC (ref 0–0.2)
PHOSPHATE SERPL-MCNC: 3.2 MG/DL (ref 2.5–4.5)
PLATELET # BLD AUTO: 144 10*3/MM3 (ref 140–450)
PMV BLD AUTO: 11.1 FL (ref 6–12)
POTASSIUM SERPL-SCNC: 3.9 MMOL/L (ref 3.5–5.2)
PROT SERPL-MCNC: 6.8 G/DL (ref 6–8.5)
PROTHROMBIN TIME: 15.3 SECONDS (ref 11.7–14.2)
RBC # BLD AUTO: 4.55 10*6/MM3 (ref 4.14–5.8)
SODIUM SERPL-SCNC: 140 MMOL/L (ref 136–145)
WBC NRBC COR # BLD AUTO: 10.26 10*3/MM3 (ref 3.4–10.8)

## 2024-11-11 PROCEDURE — 0F798DZ DILATION OF COMMON BILE DUCT WITH INTRALUMINAL DEVICE, VIA NATURAL OR ARTIFICIAL OPENING ENDOSCOPIC: ICD-10-PCS | Performed by: INTERNAL MEDICINE

## 2024-11-11 PROCEDURE — 43274 ERCP DUCT STENT PLACEMENT: CPT | Performed by: INTERNAL MEDICINE

## 2024-11-11 PROCEDURE — 43264 ERCP REMOVE DUCT CALCULI: CPT | Performed by: INTERNAL MEDICINE

## 2024-11-11 PROCEDURE — 25010000002 CEFTRIAXONE PER 250 MG: Performed by: STUDENT IN AN ORGANIZED HEALTH CARE EDUCATION/TRAINING PROGRAM

## 2024-11-11 PROCEDURE — C1769 GUIDE WIRE: HCPCS | Performed by: INTERNAL MEDICINE

## 2024-11-11 PROCEDURE — 84100 ASSAY OF PHOSPHORUS: CPT | Performed by: STUDENT IN AN ORGANIZED HEALTH CARE EDUCATION/TRAINING PROGRAM

## 2024-11-11 PROCEDURE — C2625 STENT, NON-COR, TEM W/DEL SY: HCPCS | Performed by: INTERNAL MEDICINE

## 2024-11-11 PROCEDURE — 83735 ASSAY OF MAGNESIUM: CPT | Performed by: NURSE PRACTITIONER

## 2024-11-11 PROCEDURE — 0FC98ZZ EXTIRPATION OF MATTER FROM COMMON BILE DUCT, VIA NATURAL OR ARTIFICIAL OPENING ENDOSCOPIC: ICD-10-PCS | Performed by: INTERNAL MEDICINE

## 2024-11-11 PROCEDURE — 25010000002 METRONIDAZOLE 500 MG/100ML SOLUTION: Performed by: INTERNAL MEDICINE

## 2024-11-11 PROCEDURE — 85025 COMPLETE CBC W/AUTO DIFF WBC: CPT | Performed by: NURSE PRACTITIONER

## 2024-11-11 PROCEDURE — 25010000002 HYDROMORPHONE PER 4 MG: Performed by: NURSE PRACTITIONER

## 2024-11-11 PROCEDURE — 25510000001 IOPAMIDOL 61 % SOLUTION 30 ML VIAL: Performed by: INTERNAL MEDICINE

## 2024-11-11 PROCEDURE — 25010000002 METRONIDAZOLE 500 MG/100ML SOLUTION: Performed by: STUDENT IN AN ORGANIZED HEALTH CARE EDUCATION/TRAINING PROGRAM

## 2024-11-11 PROCEDURE — 80053 COMPREHEN METABOLIC PANEL: CPT | Performed by: NURSE PRACTITIONER

## 2024-11-11 PROCEDURE — 25810000003 LACTATED RINGERS PER 1000 ML: Performed by: STUDENT IN AN ORGANIZED HEALTH CARE EDUCATION/TRAINING PROGRAM

## 2024-11-11 PROCEDURE — 99222 1ST HOSP IP/OBS MODERATE 55: CPT | Performed by: SURGERY

## 2024-11-11 PROCEDURE — 25010000002 PROPOFOL 200 MG/20ML EMULSION: Performed by: ANESTHESIOLOGIST ASSISTANT

## 2024-11-11 PROCEDURE — BF101ZZ FLUOROSCOPY OF BILE DUCTS USING LOW OSMOLAR CONTRAST: ICD-10-PCS | Performed by: INTERNAL MEDICINE

## 2024-11-11 PROCEDURE — 86140 C-REACTIVE PROTEIN: CPT | Performed by: NURSE PRACTITIONER

## 2024-11-11 PROCEDURE — 25010000002 ONDANSETRON PER 1 MG: Performed by: ANESTHESIOLOGIST ASSISTANT

## 2024-11-11 PROCEDURE — 83690 ASSAY OF LIPASE: CPT | Performed by: NURSE PRACTITIONER

## 2024-11-11 PROCEDURE — 25010000002 DEXAMETHASONE PER 1 MG: Performed by: ANESTHESIOLOGIST ASSISTANT

## 2024-11-11 PROCEDURE — 25810000003 SODIUM CHLORIDE 0.9 % SOLUTION: Performed by: ANESTHESIOLOGIST ASSISTANT

## 2024-11-11 PROCEDURE — 25010000002 GLUCAGON (RDNA) PER 1 MG: Performed by: ANESTHESIOLOGIST ASSISTANT

## 2024-11-11 PROCEDURE — 74330 X-RAY BILE/PANC ENDOSCOPY: CPT

## 2024-11-11 PROCEDURE — 85610 PROTHROMBIN TIME: CPT | Performed by: NURSE PRACTITIONER

## 2024-11-11 DEVICE — BILIARY STENT WITH NAVIFLEXTM RX DELIVERY SYSTEM
Type: IMPLANTABLE DEVICE | Site: BILE DUCT | Status: FUNCTIONAL
Brand: ADVANIX™ BILIARY

## 2024-11-11 RX ORDER — INDOMETHACIN 100 MG
SUPPOSITORY, RECTAL RECTAL
Status: DISPENSED
Start: 2024-11-11 | End: 2024-11-11

## 2024-11-11 RX ORDER — INDOMETHACIN 100 MG
SUPPOSITORY, RECTAL RECTAL AS NEEDED
Status: DISCONTINUED | OUTPATIENT
Start: 2024-11-11 | End: 2024-11-11 | Stop reason: HOSPADM

## 2024-11-11 RX ORDER — ONDANSETRON 2 MG/ML
INJECTION INTRAMUSCULAR; INTRAVENOUS AS NEEDED
Status: DISCONTINUED | OUTPATIENT
Start: 2024-11-11 | End: 2024-11-11 | Stop reason: SURG

## 2024-11-11 RX ORDER — SODIUM CHLORIDE 9 MG/ML
INJECTION, SOLUTION INTRAVENOUS CONTINUOUS PRN
Status: DISCONTINUED | OUTPATIENT
Start: 2024-11-11 | End: 2024-11-11 | Stop reason: SURG

## 2024-11-11 RX ORDER — ROCURONIUM BROMIDE 10 MG/ML
INJECTION, SOLUTION INTRAVENOUS AS NEEDED
Status: DISCONTINUED | OUTPATIENT
Start: 2024-11-11 | End: 2024-11-11 | Stop reason: SURG

## 2024-11-11 RX ORDER — DEXAMETHASONE SODIUM PHOSPHATE 4 MG/ML
INJECTION, SOLUTION INTRA-ARTICULAR; INTRALESIONAL; INTRAMUSCULAR; INTRAVENOUS; SOFT TISSUE AS NEEDED
Status: DISCONTINUED | OUTPATIENT
Start: 2024-11-11 | End: 2024-11-11 | Stop reason: SURG

## 2024-11-11 RX ORDER — SODIUM CHLORIDE 9 MG/ML
INJECTION, SOLUTION INTRAVENOUS
Status: COMPLETED
Start: 2024-11-11 | End: 2024-11-11

## 2024-11-11 RX ORDER — IBUPROFEN 600 MG/1
TABLET ORAL AS NEEDED
Status: DISCONTINUED | OUTPATIENT
Start: 2024-11-11 | End: 2024-11-11 | Stop reason: SURG

## 2024-11-11 RX ORDER — PROPOFOL 10 MG/ML
INJECTION, EMULSION INTRAVENOUS AS NEEDED
Status: DISCONTINUED | OUTPATIENT
Start: 2024-11-11 | End: 2024-11-11 | Stop reason: SURG

## 2024-11-11 RX ADMIN — HYDROMORPHONE HYDROCHLORIDE 0.5 MG: 1 INJECTION, SOLUTION INTRAMUSCULAR; INTRAVENOUS; SUBCUTANEOUS at 07:11

## 2024-11-11 RX ADMIN — ROCURONIUM BROMIDE 50 MG: 10 INJECTION, SOLUTION INTRAVENOUS at 08:50

## 2024-11-11 RX ADMIN — Medication 10 ML: at 20:25

## 2024-11-11 RX ADMIN — METRONIDAZOLE 500 MG: 500 INJECTION, SOLUTION INTRAVENOUS at 05:02

## 2024-11-11 RX ADMIN — HYDROMORPHONE HYDROCHLORIDE 0.5 MG: 1 INJECTION, SOLUTION INTRAMUSCULAR; INTRAVENOUS; SUBCUTANEOUS at 02:11

## 2024-11-11 RX ADMIN — HYDROMORPHONE HYDROCHLORIDE 0.5 MG: 1 INJECTION, SOLUTION INTRAMUSCULAR; INTRAVENOUS; SUBCUTANEOUS at 05:02

## 2024-11-11 RX ADMIN — PROPOFOL 200 MG: 10 INJECTION, EMULSION INTRAVENOUS at 08:50

## 2024-11-11 RX ADMIN — METRONIDAZOLE 500 MG: 500 INJECTION, SOLUTION INTRAVENOUS at 20:19

## 2024-11-11 RX ADMIN — CEFTRIAXONE 1000 MG: 1 INJECTION, POWDER, FOR SOLUTION INTRAMUSCULAR; INTRAVENOUS at 11:06

## 2024-11-11 RX ADMIN — SODIUM CHLORIDE: 9 INJECTION, SOLUTION INTRAVENOUS at 08:50

## 2024-11-11 RX ADMIN — PANTOPRAZOLE SODIUM 40 MG: 40 INJECTION, POWDER, FOR SOLUTION INTRAVENOUS at 05:02

## 2024-11-11 RX ADMIN — DEXAMETHASONE SODIUM PHOSPHATE 4 MG: 4 INJECTION, SOLUTION INTRAMUSCULAR; INTRAVENOUS at 09:02

## 2024-11-11 RX ADMIN — METRONIDAZOLE 500 MG: 500 INJECTION, SOLUTION INTRAVENOUS at 12:16

## 2024-11-11 RX ADMIN — SODIUM CHLORIDE, POTASSIUM CHLORIDE, SODIUM LACTATE AND CALCIUM CHLORIDE 100 ML/HR: 600; 310; 30; 20 INJECTION, SOLUTION INTRAVENOUS at 05:26

## 2024-11-11 RX ADMIN — GLUCAGON 0.5 MG: KIT at 09:15

## 2024-11-11 RX ADMIN — ONDANSETRON 4 MG: 2 INJECTION INTRAMUSCULAR; INTRAVENOUS at 09:02

## 2024-11-11 NOTE — ANESTHESIA PROCEDURE NOTES
Airway  Date/Time: 11/11/2024 8:51 AM    Indications and Patient Condition  Indications for airway management: airway protection    Preoxygenated: yes  MILS maintained throughout  Mask difficulty assessment: 0 - not attempted    Final Airway Details  Final airway type: endotracheal airway      Successful airway: ETT     Successful intubation technique: video laryngoscopy  Facilitating devices/methods: intubating stylet  Endotracheal tube insertion site: oral  Blade: Bauman  ETT size (mm): 7.5  Cormack-Lehane Classification: grade I - full view of glottis  Measured from: teeth  ETT/EBT  to teeth (cm): 22  Number of attempts at approach: 1  Assessment: lips, teeth, and gum same as pre-op and atraumatic intubation

## 2024-11-11 NOTE — PLAN OF CARE
Problem: Adult Inpatient Plan of Care  Goal: Plan of Care Review  Outcome: Progressing  Flowsheets (Taken 11/10/2024 1919)  Progress: improving  Plan of Care Reviewed With: patient  Goal: Patient-Specific Goal (Individualized)  Outcome: Progressing  Goal: Absence of Hospital-Acquired Illness or Injury  Outcome: Progressing  Intervention: Identify and Manage Fall Risk  Description: Perform standard risk assessment on admission using a validated tool or comprehensive approach appropriate to the patient; reassess fall risk frequently, with change in status or transfer to another level of care.  Communicate risk to interprofessional healthcare team; ensure fall risk visible cue.  Determine need for increased observation, equipment and environmental modification, as well as use of supportive, nonskid footwear.  Adjust safety measures to individual needs and identified risk factors.  Reinforce the importance of active participation with fall risk prevention, safety, and physical activity with the patient and family.  Perform regular intentional rounding to assess need for position change, pain assessment and personal needs, including assistance with toileting.  Recent Flowsheet Documentation  Taken 11/10/2024 1600 by Michaela Main RN  Safety Promotion/Fall Prevention:   fall prevention program maintained   safety round/check completed  Taken 11/10/2024 1400 by Michaela Main, RN  Safety Promotion/Fall Prevention:   safety round/check completed   fall prevention program maintained  Taken 11/10/2024 1200 by Michaela Main, RN  Safety Promotion/Fall Prevention:   safety round/check completed   fall prevention program maintained  Taken 11/10/2024 1000 by Michaela Main, RN  Safety Promotion/Fall Prevention:   safety round/check completed   fall prevention program maintained  Taken 11/10/2024 0800 by Michaela Main, RN  Safety Promotion/Fall Prevention:   safety round/check completed   fall prevention program  maintained  Intervention: Prevent Skin Injury  Description: Perform a screening for skin injury risk, such as pressure or moisture-associated skin damage on admission and at regular intervals throughout hospital stay.  Keep all areas of skin (especially folds) clean and dry.  Maintain adequate skin hydration.  Relieve and redistribute pressure and protect bony prominences and skin at risk for injury; implement measures based on patient-specific risk factors.  Match turning and repositioning schedule to clinical condition.  Encourage weight shift frequently; assist with reposition if unable to complete independently.  Float heels off bed; avoid pressure on the Achilles tendon.  Keep skin free from extended contact with medical devices.  Optimize nutrition and hydration.  Encourage functional activity and mobility, as early as tolerated.  Use aids (e.g., slide boards, mechanical lift) during transfer.  Recent Flowsheet Documentation  Taken 11/10/2024 1600 by Michaela Main RN  Skin Protection:   incontinence pads utilized   protective footwear used  Taken 11/10/2024 0800 by Michaela Main RN  Skin Protection: protective footwear used  Intervention: Prevent and Manage VTE (Venous Thromboembolism) Risk  Description: Assess for VTE (venous thromboembolism) risk.  Promote early mobilization; encourage both active and passive leg exercises, if unable to ambulate.  Initiate and maintain compression or other therapy, as indicated, based on identified risk in accordance with organizational protocol and provider order.  Recognize the patient's individual risk for bleeding before initiating pharmacologic thromboprophylaxis.  Recent Flowsheet Documentation  Taken 11/10/2024 1200 by Michaela Main, RN  VTE Prevention/Management: (patient up ad umm) SCDs (sequential compression devices) off  Taken 11/10/2024 0800 by Michaela Main RN  VTE Prevention/Management: (patient up ad umm) SCDs (sequential compression devices)  off  Goal: Optimal Comfort and Wellbeing  Outcome: Progressing  Intervention: Monitor Pain and Promote Comfort  Description: Assess pain level, treatment efficacy and patient response at regular intervals using a consistent pain scale.  Consider the presence and impact of preexisting chronic pain.  Encourage patient and caregiver involvement in pain assessment, interventions and safety measures.  Promote activity; balance with sleep and rest to enhance healing.  Recent Flowsheet Documentation  Taken 11/10/2024 1645 by Michaela Main RN  Pain Management Interventions: pain medication given  Taken 11/10/2024 1630 by Michaela Main RN  Pain Management Interventions: pain medication given  Taken 11/10/2024 1252 by Michaela Main RN  Pain Management Interventions: pain medication given  Taken 11/10/2024 1222 by Michaela Main RN  Pain Management Interventions: pain medication given  Taken 11/10/2024 0830 by Michaela Main RN  Pain Management Interventions: pain medication given  Taken 11/10/2024 0815 by Michaela Main RN  Pain Management Interventions: pain medication given  Taken 11/10/2024 0800 by Michaela Main RN  Pain Management Interventions: pain medication given  Intervention: Provide Person-Centered Care  Description: Use a family-focused approach to care; encourage support system presence and participation.  Develop trust and rapport by proactively providing information, encouraging questions, addressing concerns and offering reassurance.  Acknowledge emotional response to hospitalization.  Recognize and utilize personal coping strategies and strengths; develop goals via shared decision-making.  Honor spiritual and cultural preferences.  Recent Flowsheet Documentation  Taken 11/10/2024 1200 by Michaela Main RN  Trust Relationship/Rapport:   care explained   choices provided  Taken 11/10/2024 0800 by Michaela Main RN  Trust Relationship/Rapport:   care explained   choices provided    emotional support provided  Goal: Readiness for Transition of Care  Outcome: Progressing     Problem: Comorbidity Management  Goal: Blood Pressure in Desired Range  Outcome: Progressing   Goal Outcome Evaluation:  Plan of Care Reviewed With: patient        Progress: improving

## 2024-11-11 NOTE — OP NOTE
ENDOSCOPIC RETROGRADE CHOLANGIOPANCREATOGRAPHY Procedure Report    Patient Name:  Teddy Vega  YOB: 1950    Date of Surgery:  11/11/2024     Pre-Op Diagnosis:  Choledocholithiasis [K80.50]        Procedure/CPT® Codes:      Procedure(s):  ENDOSCOPIC RETROGRADE CHOLANGIOPANCREATOGRAPHY, sphincterotomy, balloon clearance of common bile duct (9-12mm), biliary stent placement    Staff:  Surgeon(s):  Julio Rashid MD      Anesthesia: General    Description of Procedure:  A description of the procedure as well as risks, benefits and alternative methods were explained to the patient who voiced understanding and signed the corresponding consent form.Specifically risks of post-ERCP pancreatitis, bleeding, perforation, failure to canulate and adverse reaction to sedation were discussed. A physical exam was performed and vital signs were monitored throughout the procedure.    A  film was performed which was normal. With the patient in the semi-prone position, an Olympus side viewing endoscope was placed into the mouth and proceeded through the esophagus, stomach and second portion of the duodenum without difficulty. Limited views of the esophagus and stomach were normal. The ampulla was visualized and appeared normal. A [employer] hydrotome was used to cannulate the ampulla using wire guided technique. Bile was aspirated to ensure this was the duct of interest. Contrast was injected into the bile duct.      The scope was then retroflexed and the fundus was visualized. The procedure was not difficult and there were no immediate complications.    Findings:   Esophageal mucosa looks normal upper mid and lower part of esophagus Limited evaluation of the gastric mucosa looks normal there was a large amount of retained fluid precluding complete evaluation.  Gastric duodenal mucosa also looks normal, ampulla was buried behind the fold, selective cannulation of the common bile duct was obtained  initially with provide Hydratome followed by Jagtome.  Wire was selectively advanced intrahepatically followed by the catheter cholangiogram did show a filling defect in the distal part, at this stage, sphincterotomy was performed at 11 to 12 o'clock position with removal of stone and purulent material.  9 to 12 mm balloon was used for removal of stone.  Despite multiple sweeps, bile and contrast was not draining freely.  Due to drainage of purulent material and poor drainage of the contrast we opted to place 10 [place] 9 cm stent with excellent drainage.  Pancreas duct was not cannulated during this procedure.  Patient did receive IV fluid and indomethacin.    Impression:  1.  Choledocholithiasis status post of sphincterotomy and removal of stone and purulent material.  Placement of 10 [place] 9 cm stent in common bile duct due to poor drainage of contrast and bile.  2.  Normal limited gastric and duodenal mucosa.    Recommendations:  Follow-up with the liver function test.  Continue with IV fluid.  Clear liquids today.  Lap pranav per surgery.  We will repeat ERCP with removal of stent in 8 weeks or so.  Continue with antibiotic      Julio Rashid MD     Date: 11/11/2024    Time: 09:57 EST

## 2024-11-11 NOTE — PROGRESS NOTES
[facility] MEDICINE SERVICE  DAILY PROGRESS NOTE    NAME: Teddy Vega  : 1950  MRN: [id number]      LOS: 2 days     PROVIDER OF SERVICE: Amaya Rg MD    Chief Complaint: Epigastric pain    Subjective:     Interval History:    Patient seen and evaluated at bedside.   H&P, consults, labs and imaging reviewed.  Patient denies nausea vomiting abdominal pain.  Status post ERCP.  Treatment plan discussed with patient. All questions addressed.     Review of Systems:   All 21 ROS were negative except mentioned above.    Objective:     Vital Signs  Temp:  [98 °F (36.7 °C)-99.4 °F (37.4 °C)] 98 °F (36.7 °C)  Heart Rate:  [67-92] 70  Resp:  [14-20] 20  BP: (130-172)/(82-96) 156/89  Flow (L/min) (Oxygen Therapy):  [2] 2   Body mass index is 23.96 kg/m².    Physical Exam   General: No acute distress, appears stated age  Neuro: Awake and alert, oriented x3, no focal deficits appreciated  Head: atraumatic, normocephalic  HEENT: EOMI, anicteric, normal sclera and conjunctivae, moist mucus membranes  Neck: supple, no lymphadenopathy  CV: RRR, soft heart sounds, no murmurs appreciated, no peripheral edema  Pulm: Decreased breath sounds, no increased work of breathing, no adventitious sounds  Abd: Soft, nontender, nondistended  Skin: Warm, dry and intact  Psych: Appropriate mood and affect    Scheduled Meds   cefTRIAXone, 1,000 mg, Intravenous, Q24H  Indomethacin, , ,   metroNIDAZOLE, 500 mg, Intravenous, Q8H  pantoprazole, 40 mg, Intravenous, Q AM  sodium chloride, 10 mL, Intravenous, Q12H       PRN Meds     HYDROmorphone **AND** naloxone    Indomethacin    ketorolac    Magnesium Standard Dose Replacement - Follow Nurse / BPA Driven Protocol    ondansetron    Phosphorus Replacement - Follow Nurse / BPA Driven Protocol    Potassium Replacement - Follow Nurse / BPA Driven Protocol    sodium chloride    sodium chloride   Infusions         Diagnostic Data    Results from last 7 days   Lab Units 24  4694    WBC 10*3/mm3 10.26   HEMOGLOBIN g/dL 14.4   HEMATOCRIT % 43.8   PLATELETS 10*3/mm3 144   GLUCOSE mg/dL 105*   CREATININE mg/dL 1.02   BUN mg/dL 15   SODIUM mmol/L 140   POTASSIUM mmol/L 3.9   AST (SGOT) U/L 168*   ALT (SGPT) U/L 174*   ALK PHOS U/L 282*   BILIRUBIN mg/dL 6.3*   ANION GAP mmol/L 11.4       FL ERCP pancreatic and biliary ducts    Result Date: 11/11/2024  Impression: Images from an ERCP procedure as discussed in detail above. Electronically Signed: Javier Hickey MD  11/11/2024 11:25 AM EST  Workstation ID: [id number]    MRI abdomen w wo contrast mrcp    Result Date: 11/10/2024  Impression: 1. Gallbladder is distended with mild edematous wall thickening. Sludge or stones are noted dependently within the gallbladder and there is a trace amount of pericholecystic fluid. Findings suggest acute cholecystitis 2. There is dilation of the common duct and biliary collecting system. There is a filling defect measuring approximately 6 mm within the distal common duct compatible with choledocholithiasis. Findings called to the PCU at the time of interpretation Electronically Signed: Delroy Snyder MD  11/10/2024 2:15 PM EST  Workstation ID: [id number]    XR Chest 1 View    Result Date: 11/9/2024  Impression: Mild peripheral reticular interstitial changes likely representing chronic interstitial lung disease Electronically Signed: Juan J Gonzáles  11/9/2024 6:29 PM EST  Workstation ID: [id number]    CT Abdomen Pelvis With Contrast    Result Date: 11/9/2024  There is dilatation of the common bile duct with mild intrahepatic biliary duct dilatation and prominence of the gallbladder. Correlate with any clinical findings of biliary obstruction. MRCP might be considered to assess for an obstructing distal common  duct lesion if warranted. No other acute abnormality suggested on this exam Electronically Signed: Juan J Gonzáles  11/9/2024 6:28 PM EST  Workstation ID: [id number]     Interval results reviewed.    Assessment/Plan:      Epigastric pain/biliary colic  1.7 cm common bile duct dilatation    Patient underwent ERCP with stent placement.  Continue Rocephin and Flagyl for now.  Blood cultures no growth  Continue PPI  Will monitor overnight and plan to DC in a.m.    Treatment plan discussed with RN.     VTE Prophylaxis:  Mechanical VTE prophylaxis orders are present.         Code status is   Code Status and Medical Interventions: CPR (Attempt to Resuscitate); Full Support   Ordered at: 11/09/24 2300     Level Of Support Discussed With:    Patient     Code Status (Patient has no pulse and is not breathing):    CPR (Attempt to Resuscitate)     Medical Interventions (Patient has pulse or is breathing):    Full Support       Plan for disposition:     Barriers to Discharge: Status post ERCP  Anticipated Date of Discharge: 11/12/2024  Place of Discharge: Home      Time: 40 minutes     Signature: Electronically signed by Amaya Rg MD, 11/11/24, 14:13 EST.  [facility] Hospitalist Team

## 2024-11-11 NOTE — NURSING NOTE
Pt transported to endo with watch and wallet for security to hold during procedure. Security called by endo transporter to meet in endo to collect belongings.

## 2024-11-11 NOTE — ANESTHESIA PREPROCEDURE EVALUATION
Anesthesia Evaluation     Patient summary reviewed and Nursing notes reviewed   NPO Solid Status: > 8 hours  NPO Liquid Status: > 8 hours           Airway   Mallampati: II  TM distance: >3 FB  Neck ROM: full  No difficulty expected  Dental - normal exam     Pulmonary - negative pulmonary ROS and normal exam   Cardiovascular - normal exam    (+) hypertension      Neuro/Psych  GI/Hepatic/Renal/Endo - negative ROS     Musculoskeletal (-) negative ROS    Abdominal  - normal exam    Bowel sounds: normal.   Substance History - negative use     OB/GYN negative ob/gyn ROS         Other        ROS/Med Hx Other: H/o right facial nerve problems. Right mouth droop              Anesthesia Plan    ASA 2     general     intravenous induction     Anesthetic plan, risks, benefits, and alternatives have been provided, discussed and informed consent has been obtained with: patient.    CODE STATUS:    Level Of Support Discussed With: Patient  Code Status (Patient has no pulse and is not breathing): CPR (Attempt to Resuscitate)  Medical Interventions (Patient has pulse or is breathing): Full Support

## 2024-11-11 NOTE — CONSULTS
GENERAL SURGERY CONSULTATION NOTE    Consult requested by: Dr. Rashid    Patient Care Team:  Facundo Root MD as PCP - General    Reason for consult: Choledocholithiasis and acute cholecystitis    Subjective     Patient is a 74 y.o. male presented initially on 11/9/2024 with epigastric abdominal pain.  3 weeks ago he had an episode of epigastric abdominal pain which was relieved with 3 doses of Pepto-Bismol.  The day prior to admission he reports that he had severe onset of epigastric abdominal pain.  He reported some nausea with radiation to the back but did not have any vomiting or diarrhea or fevers.  Patient was found to have on CT scan of the abdomen and pelvis dilation of the common bile duct with mild intrahepatic biliary ductal dilatation and prominence of the gallbladder.  MRCP demonstrated a 6 mm filling defect in the distal common bile duct concerning for choledocholithiasis.  Yesterday, the patient was found to have elevated LFTs with a total bilirubin of 5.8.  Today his bilirubin was 6.3.  He underwent an uncomplicated ERCP with stent placement and stone extraction.    Review of Systems   Gastrointestinal:  Positive for abdominal pain and nausea.   Skin:  Positive for color change.        History  History reviewed. No pertinent past medical history.  History reviewed. No pertinent surgical history.  History reviewed. No pertinent family history.  Social History     Tobacco Use    Smoking status: Never    Smokeless tobacco: Never   Vaping Use    Vaping status: Never Used   Substance Use Topics    Alcohol use: Never    Drug use: Never     Medications Prior to Admission   Medication Sig Dispense Refill Last Dose/Taking    Omega-3 Fatty Acids (FISH OIL PO) Take  by mouth Daily.   11/8/2024    pantoprazole (PROTONIX) 40 MG EC tablet Take 1 tablet by mouth Daily.   11/8/2024     Allergies:  Patient has no known allergies.    Objective     Vital Signs  /86 (BP Location: Right arm, Patient Position:  "Lying)   Pulse 70   Temp 98.4 °F (36.9 °C) (Oral)   Resp 17   Ht 170.2 cm (67.01\")   Wt 69.4 kg (153 lb)   SpO2 93%   BMI 23.96 kg/m²      Physical Exam  Vitals reviewed.   Constitutional:       Appearance: He is well-developed.   HENT:      Head: Normocephalic and atraumatic.   Eyes:      Pupils: Pupils are equal, round, and reactive to light.   Cardiovascular:      Rate and Rhythm: Normal rate and regular rhythm.   Pulmonary:      Effort: Pulmonary effort is normal.      Breath sounds: Normal breath sounds.   Abdominal:      General: There is no distension.      Palpations: Abdomen is soft.      Tenderness: There is no abdominal tenderness.      Hernia: No hernia is present.      Comments: Well-healed vertical midline incision   Musculoskeletal:         General: Normal range of motion.      Cervical back: Normal range of motion.   Lymphadenopathy:      Cervical: No cervical adenopathy.   Skin:     General: Skin is warm and dry.      Coloration: Skin is jaundiced.      Findings: No rash.   Neurological:      Mental Status: He is alert and oriented to person, place, and time.         Results Review:   Lab Results (last 24 hours)       Procedure Component Value Units Date/Time    Magnesium [[id number]]  (Normal) Collected: 11/11/24 0214    Specimen: Blood Updated: 11/11/24 0244     Magnesium 1.8 mg/dL     Comprehensive Metabolic Panel [[id number]]  (Abnormal) Collected: 11/11/24 0214    Specimen: Blood Updated: 11/11/24 0244     Glucose 105 mg/dL      BUN 15 mg/dL      Creatinine 1.02 mg/dL      Sodium 140 mmol/L      Potassium 3.9 mmol/L      Comment: Slight hemolysis detected by analyzer. Result may be falsely elevated.        Chloride 102 mmol/L      CO2 26.6 mmol/L      Calcium 8.8 mg/dL      Total Protein 6.8 g/dL      Albumin 4.1 g/dL      ALT (SGPT) 174 U/L      AST (SGOT) 168 U/L      Comment: Slight hemolysis detected by analyzer. Result may be falsely elevated.        Alkaline Phosphatase 282 U/L      " Total Bilirubin 6.3 mg/dL      Globulin 2.7 gm/dL      A/G Ratio 1.5 g/dL      BUN/Creatinine Ratio 14.7     Anion Gap 11.4 mmol/L      eGFR 76.2 mL/min/1.73     Narrative:      GFR Normal >60  Chronic Kidney Disease <60  Kidney Failure <15    The GFR formula is only valid for adults with stable renal function between ages 18 and 70.    C-reactive Protein [[id number]]  (Abnormal) Collected: 11/11/24 0214    Specimen: Blood Updated: 11/11/24 0243     C-Reactive Protein 9.60 mg/dL     Lipase [[id number]]  (Normal) Collected: 11/11/24 0214    Specimen: Blood Updated: 11/11/24 0243     Lipase 22 U/L     Phosphorus [[id number]]  (Normal) Collected: 11/11/24 0214    Specimen: Blood Updated: 11/11/24 0243     Phosphorus 3.2 mg/dL     Protime-INR [[id number]]  (Abnormal) Collected: 11/11/24 0214    Specimen: Blood Updated: 11/11/24 0231     Protime 15.3 Seconds      INR 1.21    CBC & Differential [[id number]]  (Abnormal) Collected: 11/11/24 0214    Specimen: Blood Updated: 11/11/24 0222    Narrative:      The following orders were created for panel order CBC & Differential.  Procedure                               Abnormality         Status                     ---------                               -----------         ------                     CBC Auto Differential[[id number]]        Abnormal            Final result                 Please view results for these tests on the individual orders.    CBC Auto Differential [[id number]]  (Abnormal) Collected: 11/11/24 0214    Specimen: Blood Updated: 11/11/24 0222     WBC 10.26 10*3/mm3      RBC 4.55 10*6/mm3      Hemoglobin 14.4 g/dL      Hematocrit 43.8 %      MCV 96.3 fL      MCH 31.6 pg      MCHC 32.9 g/dL      RDW 13.1 %      RDW-SD 46.7 fl      MPV 11.1 fL      Platelets 144 10*3/mm3      Neutrophil % 72.4 %      Lymphocyte % 12.9 %      Monocyte % 10.7 %      Eosinophil % 3.3 %      Basophil % 0.3 %      Immature Grans % 0.4 %      Neutrophils, Absolute 7.43 10*3/mm3       Lymphocytes, Absolute 1.32 10*3/mm3      Monocytes, Absolute 1.10 10*3/mm3      Eosinophils, Absolute 0.34 10*3/mm3      Basophils, Absolute 0.03 10*3/mm3      Immature Grans, Absolute 0.04 10*3/mm3      nRBC 0.0 /100 WBC     Blood Culture - Blood, Arm, Left [[id number]]  (Normal) Collected: 11/10/24 0144    Specimen: Blood from Arm, Left Updated: 11/11/24 0200     Blood Culture No growth at 24 hours    Blood Culture - Blood, Arm, Right [[id number]]  (Normal) Collected: 11/10/24 0144    Specimen: Blood from Arm, Right Updated: 11/11/24 0200     Blood Culture No growth at 24 hours    Narrative:      Less than seven (7) mL's of blood was collected.  Insufficient quantity may yield false negative results.          FL ERCP pancreatic and biliary ducts    Result Date: 11/11/2024  Impression: Images from an ERCP procedure as discussed in detail above. Electronically Signed: Javier Hickey MD  11/11/2024 11:25 AM EST  Workstation ID: [id number]    MRI abdomen w wo contrast mrcp    Result Date: 11/10/2024  Impression: 1. Gallbladder is distended with mild edematous wall thickening. Sludge or stones are noted dependently within the gallbladder and there is a trace amount of pericholecystic fluid. Findings suggest acute cholecystitis 2. There is dilation of the common duct and biliary collecting system. There is a filling defect measuring approximately 6 mm within the distal common duct compatible with choledocholithiasis. Findings called to the PCU at the time of interpretation Electronically Signed: Delroy Snyedr MD  11/10/2024 2:15 PM EST  Workstation ID: [id number]    XR Chest 1 View    Result Date: 11/9/2024  Impression: Mild peripheral reticular interstitial changes likely representing chronic interstitial lung disease Electronically Signed: Juan J Gonzáles  11/9/2024 6:29 PM EST  Workstation ID: [id number]    CT Abdomen Pelvis With Contrast    Result Date: 11/9/2024  There is dilatation of the common bile duct with mild  intrahepatic biliary duct dilatation and prominence of the gallbladder. Correlate with any clinical findings of biliary obstruction. MRCP might be considered to assess for an obstructing distal common  duct lesion if warranted. No other acute abnormality suggested on this exam Electronically Signed: Juan J Gonzáles  11/9/2024 6:28 PM EST  Workstation ID: [id number]       I reviewed the patient's new imaging results and agree with the interpretation.  I reviewed the patient's other test results and agree with the interpretation    Assessment & Plan     Principal Problem:    Epigastric pain  Active Problems:    Choledocholithiasis    I discussed with the patient the findings of acute cholecystitis and choledocholithiasis, and reviewed the natural history of acute cholecystitis and choledocholithiasis as well as management options which include both operative and nonoperative strategies.  We discussed the risk, benefits, and alternatives to laparoscopic cholecystectomy which include but are not limited to: bleeding, infection, damage to surrounding structures including bowel and common bile duct, cystic stump leak, and possible need to convert to open.  The patient reports that he has multiple family members who have had their gallbladders removed and all of them have difficulty eating with frequent bouts of diarrhea.  He states that his pain has resolved after the ERCP and stent placement.  At present, the patient is refusing surgery despite my emphasis that removal of the gallbladder would help to prevent further episodes of this occurring in the future, and there is still concern for possible acute cholecystitis  Regardless, the patient is unwilling to proceed with surgery even when I offered him surgery tomorrow morning.  He states that he wishes to be discharged home and to think about it.  My recommendation would be to proceed with surgery during this hospitalization, but at present the patient is unwilling to  proceed.  Therefore, if the patient is adamant and refusing surgery the best we can offer him is empiric antibiotic therapy upon discharge and instructions to follow-up with me again for laparoscopic, possible open cholecystectomy in the near future.  Please feel free to message me if the patient changes his mind and is agreeable to proceeding with surgery    I discussed the patients findings and my recommendations with the patient.     Benito Elena MD  11/11/24  17:33 EST

## 2024-11-11 NOTE — PLAN OF CARE
Goal Outcome Evaluation:   Pt A&Ox4 with VSS. ERCP completed during this shift with a stent place. No complaints of pain or discomfort post-procedure. Pt able to voice concerns. Call light within reach of pt.

## 2024-11-11 NOTE — ANESTHESIA POSTPROCEDURE EVALUATION
Patient: Teddy Vega    Procedure Summary       Date: 11/11/24 Room / Location: [facility] ENDOSCOPY 2 / [facility] ENDOSCOPY    Anesthesia Start: 0847 Anesthesia Stop: 0947    Procedure: ENDOSCOPIC RETROGRADE CHOLANGIOPANCREATOGRAPHY, sphincterotomy, balloon clearance of common bile duct (9-12mm), biliary stent placement Diagnosis:       Choledocholithiasis      (Choledocholithiasis [K80.50])    Surgeons: Julio Rashid MD Provider: Warren Smith MD    Anesthesia Type: general ASA Status: 2            Anesthesia Type: general    Vitals  Vitals Value Taken Time   /96 11/11/24 1030   Temp 98.2 °F (36.8 °C) 11/11/24 0954   Pulse 74 11/11/24 1032   Resp 16 11/11/24 1020   SpO2 92 % 11/11/24 1032   Vitals shown include unfiled device data.        Post Anesthesia Care and Evaluation    Patient location during evaluation: PACU  Patient participation: complete - patient participated  Level of consciousness: awake  Pain scale: See nurse's notes for pain score.  Pain management: adequate    Airway patency: patent  Anesthetic complications: No anesthetic complications  PONV Status: none  Cardiovascular status: acceptable  Respiratory status: acceptable and spontaneous ventilation  Hydration status: acceptable    Comments: Patient seen and examined postoperatively; vital signs stable; SpO2 greater than or equal to 90%; cardiopulmonary status stable; nausea/vomiting adequately controlled; pain adequately controlled; no apparent anesthesia complications; patient discharged from anesthesia care when discharge criteria were met

## 2024-11-12 ENCOUNTER — INPATIENT HOSPITAL (OUTPATIENT)
Age: 74
End: 2024-11-12
Payer: COMMERCIAL

## 2024-11-12 ENCOUNTER — INPATIENT HOSPITAL (OUTPATIENT)
Dept: URBAN - METROPOLITAN AREA HOSPITAL 84 | Facility: HOSPITAL | Age: 74
End: 2024-11-12
Payer: COMMERCIAL

## 2024-11-12 VITALS
SYSTOLIC BLOOD PRESSURE: 165 MMHG | WEIGHT: 156.53 LBS | HEIGHT: 67 IN | DIASTOLIC BLOOD PRESSURE: 96 MMHG | HEART RATE: 64 BPM | TEMPERATURE: 98.2 F | RESPIRATION RATE: 17 BRPM | BODY MASS INDEX: 24.57 KG/M2 | OXYGEN SATURATION: 96 %

## 2024-11-12 DIAGNOSIS — Z96.89 PRESENCE OF OTHER SPECIFIED FUNCTIONAL IMPLANTS: ICD-10-CM

## 2024-11-12 DIAGNOSIS — R94.5 ABNORMAL RESULTS OF LIVER FUNCTION STUDIES: ICD-10-CM

## 2024-11-12 DIAGNOSIS — K80.50 CALCULUS OF BILE DUCT WITHOUT CHOLANGITIS OR CHOLECYSTITIS W: ICD-10-CM

## 2024-11-12 DIAGNOSIS — R93.2 ABNORMAL FINDINGS ON DIAGNOSTIC IMAGING OF LIVER AND BILIARY: ICD-10-CM

## 2024-11-12 LAB
ALBUMIN SERPL-MCNC: 3.6 G/DL (ref 3.5–5.2)
ALBUMIN/GLOB SERPL: 1.4 G/DL
ALP SERPL-CCNC: 213 U/L (ref 39–117)
ALT SERPL W P-5'-P-CCNC: 106 U/L (ref 1–41)
ANION GAP SERPL CALCULATED.3IONS-SCNC: 9.7 MMOL/L (ref 5–15)
AST SERPL-CCNC: 82 U/L (ref 1–40)
BASOPHILS # BLD AUTO: 0.01 10*3/MM3 (ref 0–0.2)
BASOPHILS NFR BLD AUTO: 0.1 % (ref 0–1.5)
BILIRUB SERPL-MCNC: 3.5 MG/DL (ref 0–1.2)
BUN SERPL-MCNC: 16 MG/DL (ref 8–23)
BUN/CREAT SERPL: 17.8 (ref 7–25)
CALCIUM SPEC-SCNC: 8.5 MG/DL (ref 8.6–10.5)
CHLORIDE SERPL-SCNC: 105 MMOL/L (ref 98–107)
CO2 SERPL-SCNC: 26.3 MMOL/L (ref 22–29)
CREAT SERPL-MCNC: 0.9 MG/DL (ref 0.76–1.27)
DACRYOCYTES BLD QL SMEAR: NORMAL
DEPRECATED RDW RBC AUTO: 44.4 FL (ref 37–54)
EGFRCR SERPLBLD CKD-EPI 2021: 88.5 ML/MIN/1.73
EOSINOPHIL # BLD AUTO: 0.04 10*3/MM3 (ref 0–0.4)
EOSINOPHIL NFR BLD AUTO: 0.5 % (ref 0.3–6.2)
ERYTHROCYTE [DISTWIDTH] IN BLOOD BY AUTOMATED COUNT: 12.5 % (ref 12.3–15.4)
GIANT PLATELETS: NORMAL
GLOBULIN UR ELPH-MCNC: 2.5 GM/DL
GLUCOSE SERPL-MCNC: 128 MG/DL (ref 65–99)
HCT VFR BLD AUTO: 39 % (ref 37.5–51)
HGB BLD-MCNC: 13.1 G/DL (ref 13–17.7)
IMM GRANULOCYTES # BLD AUTO: 0.03 10*3/MM3 (ref 0–0.05)
IMM GRANULOCYTES NFR BLD AUTO: 0.3 % (ref 0–0.5)
LARGE PLATELETS: NORMAL
LIPASE SERPL-CCNC: 210 U/L (ref 13–60)
LYMPHOCYTES # BLD AUTO: 0.91 10*3/MM3 (ref 0.7–3.1)
LYMPHOCYTES NFR BLD AUTO: 10.4 % (ref 19.6–45.3)
MAGNESIUM SERPL-MCNC: 1.9 MG/DL (ref 1.6–2.4)
MCH RBC QN AUTO: 32 PG (ref 26.6–33)
MCHC RBC AUTO-ENTMCNC: 33.6 G/DL (ref 31.5–35.7)
MCV RBC AUTO: 95.4 FL (ref 79–97)
MONOCYTES # BLD AUTO: 0.93 10*3/MM3 (ref 0.1–0.9)
MONOCYTES NFR BLD AUTO: 10.6 % (ref 5–12)
NEUTROPHILS NFR BLD AUTO: 6.85 10*3/MM3 (ref 1.7–7)
NEUTROPHILS NFR BLD AUTO: 78.1 % (ref 42.7–76)
NRBC BLD AUTO-RTO: 0 /100 WBC (ref 0–0.2)
PLATELET # BLD AUTO: 130 10*3/MM3 (ref 140–450)
PMV BLD AUTO: 11.4 FL (ref 6–12)
POIKILOCYTOSIS BLD QL SMEAR: NORMAL
POTASSIUM SERPL-SCNC: 3.9 MMOL/L (ref 3.5–5.2)
PROT SERPL-MCNC: 6.1 G/DL (ref 6–8.5)
RBC # BLD AUTO: 4.09 10*6/MM3 (ref 4.14–5.8)
SMALL PLATELETS BLD QL SMEAR: NORMAL
SODIUM SERPL-SCNC: 141 MMOL/L (ref 136–145)
WBC MORPH BLD: NORMAL
WBC NRBC COR # BLD AUTO: 8.77 10*3/MM3 (ref 3.4–10.8)

## 2024-11-12 PROCEDURE — 99232 SBSQ HOSP IP/OBS MODERATE 35: CPT | Mod: FS | Performed by: NURSE PRACTITIONER

## 2024-11-12 PROCEDURE — 83735 ASSAY OF MAGNESIUM: CPT | Performed by: INTERNAL MEDICINE

## 2024-11-12 PROCEDURE — 80053 COMPREHEN METABOLIC PANEL: CPT | Performed by: INTERNAL MEDICINE

## 2024-11-12 PROCEDURE — 25010000002 METRONIDAZOLE 500 MG/100ML SOLUTION: Performed by: INTERNAL MEDICINE

## 2024-11-12 PROCEDURE — 85025 COMPLETE CBC W/AUTO DIFF WBC: CPT | Performed by: INTERNAL MEDICINE

## 2024-11-12 PROCEDURE — 85007 BL SMEAR W/DIFF WBC COUNT: CPT | Performed by: INTERNAL MEDICINE

## 2024-11-12 PROCEDURE — 83690 ASSAY OF LIPASE: CPT | Performed by: INTERNAL MEDICINE

## 2024-11-12 RX ORDER — PANTOPRAZOLE SODIUM 40 MG/1
40 TABLET, DELAYED RELEASE ORAL
Status: DISCONTINUED | OUTPATIENT
Start: 2024-11-13 | End: 2024-11-12 | Stop reason: HOSPADM

## 2024-11-12 RX ADMIN — Medication 10 ML: at 09:03

## 2024-11-12 RX ADMIN — PANTOPRAZOLE SODIUM 40 MG: 40 INJECTION, POWDER, FOR SOLUTION INTRAVENOUS at 05:29

## 2024-11-12 RX ADMIN — METRONIDAZOLE 500 MG: 500 INJECTION, SOLUTION INTRAVENOUS at 05:29

## 2024-11-12 NOTE — PLAN OF CARE
Problem: Adult Inpatient Plan of Care  Goal: Absence of Hospital-Acquired Illness or Injury  Intervention: Identify and Manage Fall Risk  Recent Flowsheet Documentation  Taken 11/12/2024 0016 by Delroy Remy RN  Safety Promotion/Fall Prevention:   room organization consistent   safety round/check completed   nonskid shoes/slippers when out of bed   fall prevention program maintained   clutter free environment maintained   assistive device/personal items within reach   activity supervised  Taken 11/11/2024 2200 by Delroy Remy RN  Safety Promotion/Fall Prevention:   room organization consistent   safety round/check completed   nonskid shoes/slippers when out of bed   fall prevention program maintained   clutter free environment maintained   assistive device/personal items within reach   activity supervised  Taken 11/11/2024 2021 by Delroy Remy RN  Safety Promotion/Fall Prevention:   safety round/check completed   room organization consistent   nonskid shoes/slippers when out of bed   fall prevention program maintained   clutter free environment maintained   assistive device/personal items within reach   activity supervised  Intervention: Prevent Skin Injury  Recent Flowsheet Documentation  Taken 11/12/2024 0016 by Delroy Remy RN  Body Position: position changed independently  Skin Protection:   drying agents applied   incontinence pads utilized   skin sealant/moisture barrier applied   transparent dressing maintained  Taken 11/11/2024 2021 by Delroy Remy RN  Body Position: position changed independently  Skin Protection:   drying agents applied   incontinence pads utilized   skin sealant/moisture barrier applied   transparent dressing maintained  Intervention: Prevent and Manage VTE (Venous Thromboembolism) Risk  Recent Flowsheet Documentation  Taken 11/11/2024 2021 by Delroy Remy RN  VTE Prevention/Management: patient refused intervention  Intervention: Prevent Infection  Recent  Flowsheet Documentation  Taken 11/12/2024 0016 by Delroy Remy RN  Infection Prevention:   single patient room provided   rest/sleep promoted   hand hygiene promoted   environmental surveillance performed  Taken 11/11/2024 2200 by Delroy Remy RN  Infection Prevention:   single patient room provided   rest/sleep promoted   hand hygiene promoted   environmental surveillance performed  Taken 11/11/2024 2021 by Delroy Remy RN  Infection Prevention:   single patient room provided   rest/sleep promoted   hand hygiene promoted   environmental surveillance performed   Goal Outcome Evaluation:

## 2024-11-12 NOTE — CASE MANAGEMENT/SOCIAL WORK
Case Management Discharge Note      Final Note: Discharge home prior to delivery of IMM letter and CM assessment.         Selected Continued Care - Discharged on 11/12/2024 Admission date: 11/9/2024 - Discharge disposition: Home or Self Care            Transportation Services  Private: Car    Final Discharge Disposition Code: 01 - home or self-care

## 2024-11-12 NOTE — PROGRESS NOTES
LOS: 3 days   Patient Care Team:  Facundo Root MD as PCP - General      Subjective     Interval History:     Subjective: Patient reports he is feeling very good following his ERCP yesterday.  Patient reports he is tolerating his diet without nausea or vomiting.  Denies any abdominal pain.  Denies constipation or diarrhea.      ROS:   No chest pain, shortness of breath, or cough.  No nausea, vomiting, abdominal pain, diarrhea, or constipation.       Medication Review:   No current facility-administered medications for this encounter.    Current Outpatient Medications:     Omega-3 Fatty Acids (FISH OIL PO), Take  by mouth Daily., Disp: , Rfl:     pantoprazole (PROTONIX) 40 MG EC tablet, Take 1 tablet by mouth Daily., Disp: , Rfl:     amoxicillin-clavulanate (AUGMENTIN) 875-125 MG per tablet, Take 1 tablet by mouth 2 (Two) Times a Day for 7 days., Disp: 14 tablet, Rfl: 0      Objective     Vital Signs  Vitals:    11/11/24 2050 11/12/24 0119 11/12/24 0500 11/12/24 0606   BP: 140/83 154/92  165/96   BP Location: Right arm Right arm  Right arm   Patient Position: Lying Lying  Lying   Pulse: 74 55  64   Resp: 18 16  17   Temp: 98.4 °F (36.9 °C) 97.7 °F (36.5 °C)  98.2 °F (36.8 °C)   TempSrc: Oral Oral  Oral   SpO2: 94% 99% 96% 96%   Weight:   71 kg (156 lb 8.4 oz)    Height:           Physical Exam: Sitting in bed, eating breakfast    General Appearance:    Awake and alert, in no acute distress   Head:    Normocephalic, without obvious abnormality   Eyes:          Conjunctivae normal, anicteric sclera   Throat:   No oral lesions, no thrush, oral mucosa moist   Neck:   no JVD   Lungs:     respirations regular, even and unlabored   Abdomen:     Soft, non-tender, no rebound or guarding, non-distended   Rectal:     Deferred   Extremities:   No edema, no cyanosis   Skin:   No bruising or rash, no jaundice        Results Review:    CBC    Results from last 7 days   Lab Units 11/12/24  0126 11/11/24  0214 11/09/24  7568  "11/09/24  1707   WBC 10*3/mm3 8.77 10.26 11.07* 8.20   HEMOGLOBIN g/dL 13.1 14.4 14.5 15.4   PLATELETS 10*3/mm3 130* 144 162 181     CMP   Results from last 7 days   Lab Units 11/12/24  0126 11/11/24  0214 11/10/24  1557 11/09/24  2222 11/09/24  1707   SODIUM mmol/L 141 140 141 143 140   POTASSIUM mmol/L 3.9 3.9 3.9 3.8 3.3*   CHLORIDE mmol/L 105 102 104 105 102   CO2 mmol/L 26.3 26.6 23.2 26.4 27.0   BUN mg/dL 16 15 14 15 16   CREATININE mg/dL 0.90 1.02 0.91 0.82 0.85   GLUCOSE mg/dL 128* 105* 84 119* 97   ALBUMIN g/dL 3.6 4.1 4.1  --  4.5   BILIRUBIN mg/dL 3.5* 6.3* 5.8*  --  0.4   ALK PHOS U/L 213* 282* 297*  --  75   AST (SGOT) U/L 82* 168* 217*  --  33   ALT (SGPT) U/L 106* 174* 195*  --  26   MAGNESIUM mg/dL 1.9 1.8  --  1.9  --    PHOSPHORUS mg/dL  --  3.2  --   --   --    LIPASE U/L 210* 22  --   --  27     Cr Clearance Estimated Creatinine Clearance: 70.1 mL/min (by C-G formula based on SCr of 0.9 mg/dL).  Coag   Results from last 7 days   Lab Units 11/11/24 0214   INR  1.21*     HbA1C No results found for: \"HGBA1C\"  Results from last 7 days   Lab Units 11/10/24  0144 11/09/24  2222 11/09/24  1707   HSTROP T ng/L 11 12 13     Infection   Results from last 7 days   Lab Units 11/10/24  0144   BLOODCX  No growth at 2 days  No growth at 2 days     UA    Results from last 7 days   Lab Units 11/09/24  1948   NITRITE UA  Negative   WBC UA /HPF 0-2   BACTERIA UA /HPF None Seen   SQUAM EPITHEL UA /HPF None Seen     Microbiology Results (last 10 days)       Procedure Component Value - Date/Time    Blood Culture - Blood, Arm, Left [[id number]]  (Normal) Collected: 11/10/24 0144    Lab Status: Preliminary result Specimen: Blood from Arm, Left Updated: 11/12/24 0200     Blood Culture No growth at 2 days    Blood Culture - Blood, Arm, Right [[id number]]  (Normal) Collected: 11/10/24 0144    Lab Status: Preliminary result Specimen: Blood from Arm, Right Updated: 11/12/24 0200     Blood Culture No growth at 2 days    " Narrative:      Less than seven (7) mL's of blood was collected.  Insufficient quantity may yield false negative results.          Imaging Results (Last 72 Hours)       Procedure Component Value Units Date/Time    FL ERCP pancreatic and biliary ducts [[id number]] Collected: 11/11/24 1111     Updated: 11/11/24 1127    Narrative:      FL ERCP PANCREATIC AND BILIARY DUCTS    Date of Exam: 11/11/2024 8:50 AM EST    Indication: ERCP with sphincterotomy and balloon clearance up to 12 mm.    Comparison: MRCP performed on 11/9/2024.    Technique:  A series of radiographic digital spot films were obtained in conjunction with a endoscopic catheterization of the biliary and pancreatic ductal system, performed by the gastroenterologist.    Fluoroscopic Time: 4.4 minutes    Number of Images: 5    Findings:  The initial image shows endoscopic cannulation of the biliary tree with injection of contrast. The common hepatic duct measures greater than the width of the endoscope. There are filling defects suggested in the common bile duct presumed to represent   choledocholithiasis. The final image shows removal of the endoscope with placement of a common bile duct stent. There is some residual contrast in the biliary tree. I would recommend correlation with the endoscopist's post procedural note with regard to   detailed findings from the procedure.      Impression:      Impression:  Images from an ERCP procedure as discussed in detail above.      Electronically Signed: Javier Hickey MD    11/11/2024 11:25 AM EST    Workstation ID: [id number]    MRI abdomen w wo contrast mrcp [[id number]] Collected: 11/10/24 1352     Updated: 11/10/24 1417    Narrative:      MRI ABDOMEN W WO CONTRAST MRCP    Date of Exam: 11/9/2024 11:02 PM EST    Indication: abdominal pain, dilated CBD.     Comparison: CT 11/9/2024    Technique:  Routine multiplanar/multisequence images of the abdomen were obtained with MRCP sequences before and after the uneventful  administration of Prohance.      Findings:  The gallbladder is distended with a thickened somewhat edematous appearance of the wall and a trace amount of pericholecystic fluid. Gallbladder wall measures up to 5 mm in thickness. Dependent filling defects compatible small stones and sludge are noted   within the gallbladder.    Common bile duct is dilated measuring up to 1.7 cm. There is filling defect measuring at least 6 mm noted distally within the common duct compatible with choledocholithiasis. Mild prominence of the pancreatic duct is noted which is not uncommon for   stated age no mass lesion noted at the ampulla or pancreas. Pancreas demonstrates no acute signal abnormality or abnormal enhancement.    There is intrahepatic biliary ductal dilation somewhat more prominent within the left hepatic lobe. The liver demonstrates no definite focal lesion.    Small cyst are noted within the kidneys bilaterally. The adrenal glands and the spleen appear unremarkable.    Chronic degenerative changes and deformities of the vertebral bodies are again noted in keeping with patient's stated age limited imaging of the GI tract mesenteric vascularity and portal venous system are grossly unremarkable.      Impression:      Impression:    1. Gallbladder is distended with mild edematous wall thickening. Sludge or stones are noted dependently within the gallbladder and there is a trace amount of pericholecystic fluid. Findings suggest acute cholecystitis    2. There is dilation of the common duct and biliary collecting system. There is a filling defect measuring approximately 6 mm within the distal common duct compatible with choledocholithiasis.      Findings called to the PCU at the time of interpretation    Electronically Signed: Delroy Snyder MD    11/10/2024 2:15 PM EST    Workstation ID: [id number]    XR Chest 1 View [[id number]] Collected: 11/09/24 1828     Updated: 11/09/24 1831    Narrative:      XR CHEST 1 VW    Date of  Exam: 11/9/2024 5:45 PM EST    Indication: Upper Abdominal Pain triage protocol    Comparison: None available.    Findings:  Heart size and pulmonary vasculature within normal limits. Thoracic aorta tortuous mild increased interstitial markings in the peripheral lung zones. No airspace consolidation. No free subdiaphragmatic air      Impression:      Impression:  Mild peripheral reticular interstitial changes likely representing chronic interstitial lung disease      Electronically Signed: Juan J Gonzáles    11/9/2024 6:29 PM EST    Workstation ID: [id number]    CT Abdomen Pelvis With Contrast [[id number]] Collected: 11/09/24 1823     Updated: 11/09/24 1830    Narrative:      CT ABDOMEN PELVIS W CONTRAST    Date of Exam: 11/9/2024 5:45 PM EST    Indication: epigastric.    Comparison: None available.    Technique: Axial CT images were obtained of the abdomen and pelvis following the uneventful intravenous administration of iodinated contrast. Sagittal and coronal reconstructions were performed.  Automated exposure control and iterative reconstruction   methods were used.        Findings:    Lower Chest: Mild fibrosis in the lung bases. Left pleural calcification. Bilateral fat-containing Bochdalek hernias    Organs: Common duct is dilated measuring up to 1.7 cm, and is visualized to the ampulla. There is mild intrahepatic biliary duct dilatation more notably in the left lobe. Gallbladder is somewhat prominent with no pericholecystic stranding. No focal liver   lesion is demonstrated. Spleen, pancreas, kidneys, adrenal glands unremarkable other than renal cysts. No peripancreatic inflammatory stranding    Gastrointestinal: No intestinal distention or evident wall thickening. Normal appendix    Pelvis: No abnormal fluid collection. Urinary bladder unremarkable    Peritoneum/Retroperitoneum: No ascites or pneumoperitoneum. Normal caliber aorta    Bones/Soft Tissues: No acute bony abnormality      Impression:      There is  dilatation of the common bile duct with mild intrahepatic biliary duct dilatation and prominence of the gallbladder. Correlate with any clinical findings of biliary obstruction. MRCP might be considered to assess for an obstructing distal common   duct lesion if warranted. No other acute abnormality suggested on this exam                    Electronically Signed: Juan J Gonzáles    11/9/2024 6:28 PM EST    Workstation ID: [id number]          ASSESSMENT:  -Choledocholithiasis s/p ERCP with sphincterotomy and stent placement 11/11/2024  -Epigastric pain -resolved  -Abnormal CT showing dilated CBD 1.7 mm   -Elevated LFTs -improving  -Hypertension     PLAN:  Patient is a 74-year-old male    11/11/2024 ERCP with sphincterotomy, balloon clearance of common bile duct, and biliary stent placement-10 French 9 cm stent placed in CBD, normal gastric and duodenal mucosa    Alkaline phosphatase 213, AST 82, , total bilirubin 3.5  Lipase 210  WBC 8.77, hemoglobin 13.1, platelets 130    -Patient defers general surgery recommendation for laparoscopic cholecystectomy at this time.  -Continue low-fat diet as tolerated  -Little for GI to offer at this time.  Okay to discharge home from GI standpoint.  Will see inpatient as needed.  -Outpatient hepatic function panel in 2 weeks  -ERCP in 2 months for stent removal and ensure patency of CBD.      Electronically signed by AUSTIN Daley, 11/12/24, 2:14 PM EST.

## 2024-11-12 NOTE — DISCHARGE SUMMARY
"[facility] Medicine Services  Discharge Summary    Date of Service: 2024  Patient Name: Teddy Vega  : 1950  MRN: [id number]    Date of Admission: 2024  Discharge Diagnosis: Epigastric pain  Date of Discharge: 2024  Primary Care Physician: Facundo Root MD    Presenting Problem:   Epigastric pain [R10.13]    Active and Resolved Hospital Problems:  Active Hospital Problems    Diagnosis POA    **Epigastric pain [R10.13] Yes    Choledocholithiasis [K80.50] Unknown      Resolved Hospital Problems   No resolved problems to display.         Hospital Course     HPI:  \" Teddy Vega is a 74 y.o. male with a CMH of GERD, HTN, HLD, splenectomy, former smoker who presented to [facility] on 2024 with epigastric pain. Lives at home, independent with ADLs.   Presented to free standing ED for epigastric pain. States 3 weeks ago with episode epigastric pain relieved with 3 doses of Pepto. Yesterday epigastric pain returned, self resolved after 30 minutes. Today, epigastric pain returned with nausea and radiation into back. Denies vomiting, diarrhea, fever. On arrival to ED VS: 97.8-62-/120-98% room air. Rated pain 9/10.      Upon evaluation, awake, alert, resting in bed. Current VS: 97.8-80-/87-96% room air. Rates current pain 1/10. Denies nausea at this time. Endorses compliance with daily Protonix. Last EGD per patient was several years ago. On exam with epigastric and RUQ tenderness without guarding or rebound tenderness.   Denies changes in stool color or consistency.   EKG revealed SR, IVCD, rate 66. . Qtc 461  CXR chronic interstitial lung disease, no acute findings.   CT abd/pelvis revealed dilatation of common bile duct with mild intrahepatic biliary duct dilatation and prominence of gall bladder.   Blood work reveals WBC 8.2 repeat 11.07, Hgb 15.4, Hct 46.2, unremarkable BMP, magnesium 1.9.   Urinalysis reveals 15 ketones, trace " "blood, \"    Hospital Course:  Patient was admitted to the hospital and GI and surgery were consulted.  Patient underwent ERCP, sphincterectomy, balloon clearance of common bile duct, and biliary stent placement by GI for choledocholithiasis, removal of stone and purulent material.  During hospitalization patient received ceftriaxone and Flagyl and was discharged on Augmentin 875 twice daily for 7 days.  Blood cultures no growth.  Lap pranav was suggested by surgery but patient refused.  As per surgeon  \"discussed with the patient the findings of acute cholecystitis and choledocholithiasis, and reviewed the natural history of acute cholecystitis and choledocholithiasis as well as management options which include both operative and nonoperative strategies.  We discussed the risk, benefits, and alternatives to laparoscopic cholecystectomy which include but are not limited to: bleeding, infection, damage to surrounding structures including bowel and common bile duct, cystic stump leak, and possible need to convert to open.  The patient reports that he has multiple family members who have had their gallbladders removed and all of them have difficulty eating with frequent bouts of diarrhea.  He states that his pain has resolved after the ERCP and stent placement.  At present, the patient is refusing surgery despite my emphasis that removal of the gallbladder would help to prevent further episodes of this occurring in the future, and there is still concern for possible acute cholecystitis  Regardless, the patient is unwilling to proceed with surgery even when I offered him surgery tomorrow morning.  He states that he wishes to be discharged home and to think about it.  My recommendation would be to proceed with surgery during this hospitalization, but at present the patient is unwilling to proceed.  Therefore, if the patient is adamant and refusing surgery the best we can offer him is empiric antibiotic therapy upon " "discharge and instructions to follow-up with me again for laparoscopic, possible open cholecystectomy in the near future.\"  Patient was advised to see PCP in 2 to 3 days.  Patient will see surgery as an outpatient in 1 to 2 weeks for lap pranav.  Patient will see GI for removal of ERCP stent in 8 weeks and in 4 weeks he will follow-up.  Patient's LFTs are trending down and able to to hold food without any nausea or vomiting.  DISCHARGE Follow Up Recommendations for labs and diagnostics:   Follow-up with PCP in 2 to 3 days  Follow-up with surgery in 1 week  Follow-up with GI in 4 weeks and then in 8 weeks for removal of ERCP stent    Reasons For Change In Medications and Indications for New Medications:  Augmentin 875 twice daily for a week    Day of Discharge     Vital Signs:  Temp:  [97.7 °F (36.5 °C)-98.4 °F (36.9 °C)] 98.2 °F (36.8 °C)  Heart Rate:  [55-92] 64  Resp:  [14-20] 17  BP: (140-165)/(83-96) 165/96    Physical Exam:  Vitals and nursing note reviewed.   Constitutional:       Appearance: Normal appearance.   HENT:      Head: Normocephalic and atraumatic.   Cardiovascular:      Rate and Rhythm: Normal rate and rhythm.      Heart sounds: Normal heart sounds.   Pulmonary:      Effort: Pulmonary effort is normal.      Breath sounds: Decreased breath sounds.   Abdominal:      Palpations: Abdomen is soft.   Musculoskeletal:      Cervical back: Neck supple.   Neurological:      Mental Status: Mental status is at baseline.     Pertinent  and/or Most Recent Results     LAB RESULTS:      Lab 11/12/24  0126 11/11/24  0214 11/10/24  0144 11/09/24  2222 11/09/24  1707   WBC 8.77 10.26  --  11.07* 8.20   HEMOGLOBIN 13.1 14.4  --  14.5 15.4   HEMATOCRIT 39.0 43.8  --  43.7 46.2   PLATELETS 130* 144  --  162 181   NEUTROS ABS 6.85 7.43*  --  9.92* 4.80   IMMATURE GRANS (ABS) 0.03 0.04  --  0.03 0.01   LYMPHS ABS 0.91 1.32  --  0.60* 2.22   MONOS ABS 0.93* 1.10*  --  0.49 0.87   EOS ABS 0.04 0.34  --  0.02 0.27   MCV 95.4 " 96.3  --  94.8 93.5   CRP  --  9.60*  --   --   --    LACTATE  --   --  0.8  --   --    PROTIME  --  15.3*  --   --   --          Lab 11/12/24  0126 11/11/24  0214 11/10/24  1557 11/09/24  2222 11/09/24  1707   SODIUM 141 140 141 143 140   POTASSIUM 3.9 3.9 3.9 3.8 3.3*   CHLORIDE 105 102 104 105 102   CO2 26.3 26.6 23.2 26.4 27.0   ANION GAP 9.7 11.4 13.8 11.6 11.0   BUN 16 15 14 15 16   CREATININE 0.90 1.02 0.91 0.82 0.85   EGFR 88.5 76.2 87.3 91.0 90.1   GLUCOSE 128* 105* 84 119* 97   CALCIUM 8.5* 8.8 9.2 9.0 9.3   MAGNESIUM 1.9 1.8  --  1.9  --    PHOSPHORUS  --  3.2  --   --   --          Lab 11/12/24  0126 11/11/24  0214 11/10/24  1557 11/09/24  1707   TOTAL PROTEIN 6.1 6.8 6.9 7.4   ALBUMIN 3.6 4.1 4.1 4.5   GLOBULIN 2.5 2.7 2.8 2.9   ALT (SGPT) 106* 174* 195* 26   AST (SGOT) 82* 168* 217* 33   BILIRUBIN 3.5* 6.3* 5.8* 0.4   ALK PHOS 213* 282* 297* 75   LIPASE 210* 22  --  27         Lab 11/11/24  0214 11/10/24  0144 11/09/24  2222 11/09/24  1707   HSTROP T  --  11 12 13   PROTIME 15.3*  --   --   --    INR 1.21*  --   --   --                  Brief Urine Lab Results  (Last result in the past 365 days)        Color   Clarity   Blood   Leuk Est   Nitrite   Protein   CREAT   Urine HCG        11/1950 Yellow   Clear   Trace   Negative   Negative   Negative                 Microbiology Results (last 10 days)       Procedure Component Value - Date/Time    Blood Culture - Blood, Arm, Left [[id number]]  (Normal) Collected: 11/10/24 0144    Lab Status: Preliminary result Specimen: Blood from Arm, Left Updated: 11/12/24 0200     Blood Culture No growth at 2 days    Blood Culture - Blood, Arm, Right [[id number]]  (Normal) Collected: 11/10/24 0144    Lab Status: Preliminary result Specimen: Blood from Arm, Right Updated: 11/12/24 0200     Blood Culture No growth at 2 days    Narrative:      Less than seven (7) mL's of blood was collected.  Insufficient quantity may yield false negative results.            FL ERCP  pancreatic and biliary ducts    Result Date: 11/11/2024  Impression: Impression: Images from an ERCP procedure as discussed in detail above. Electronically Signed: Javier Hickey MD  11/11/2024 11:25 AM EST  Workstation ID: [id number]    MRI abdomen w wo contrast mrcp    Result Date: 11/10/2024  Impression: Impression: 1. Gallbladder is distended with mild edematous wall thickening. Sludge or stones are noted dependently within the gallbladder and there is a trace amount of pericholecystic fluid. Findings suggest acute cholecystitis 2. There is dilation of the common duct and biliary collecting system. There is a filling defect measuring approximately 6 mm within the distal common duct compatible with choledocholithiasis. Findings called to the PCU at the time of interpretation Electronically Signed: Delroy Snyder MD  11/10/2024 2:15 PM EST  Workstation ID: [id number]    XR Chest 1 View    Result Date: 11/9/2024  Impression: Impression: Mild peripheral reticular interstitial changes likely representing chronic interstitial lung disease Electronically Signed: Juan J Gonzáles  11/9/2024 6:29 PM EST  Workstation ID: [id number]    CT Abdomen Pelvis With Contrast    Result Date: 11/9/2024  Impression: There is dilatation of the common bile duct with mild intrahepatic biliary duct dilatation and prominence of the gallbladder. Correlate with any clinical findings of biliary obstruction. MRCP might be considered to assess for an obstructing distal common  duct lesion if warranted. No other acute abnormality suggested on this exam Electronically Signed: Juan J Gonzáles  11/9/2024 6:28 PM EST  Workstation ID: [id number]                 Labs Pending at Discharge:  Pending Results       None            Procedures Performed  Procedure(s):  ENDOSCOPIC RETROGRADE CHOLANGIOPANCREATOGRAPHY, sphincterotomy, balloon clearance of common bile duct (9-12mm), biliary stent placement  11/11 7620 ERCP    Consults:   Consults       Date and Time Order  Name Status Description    11/10/2024  5:31 PM Inpatient General Surgery Consult Completed     11/10/2024 10:10 AM Inpatient Gastroenterology Consult              Discharge Details        Discharge Medications        New Medications        Instructions Start Date   amoxicillin-clavulanate 875-125 MG per tablet  Commonly known as: AUGMENTIN   1 tablet, Oral, 2 Times Daily             Continue These Medications        Instructions Start Date   FISH OIL PO   Daily      pantoprazole 40 MG EC tablet  Commonly known as: PROTONIX   40 mg, Daily               No Known Allergies    Discharge Disposition:   Home or Self Care    Diet:  Diet Instructions       Diet: Cardiac Diets; Low Sodium (2g); Thin (IDDSI 0)      Discharge Diet: Cardiac Diets    Cardiac Diet: Low Sodium (2g)    Fluid Consistency: Thin (IDDSI 0)            Discharge Activity:   Activity Instructions       Activity as Tolerated              CODE STATUS:  Code Status and Medical Interventions: CPR (Attempt to Resuscitate); Full Support   Ordered at: 11/09/24 2300     Level Of Support Discussed With:    Patient     Code Status (Patient has no pulse and is not breathing):    CPR (Attempt to Resuscitate)     Medical Interventions (Patient has pulse or is breathing):    Full Support       No future appointments.    Additional Instructions for the Follow-ups that You Need to Schedule       Discharge Follow-up with PCP   As directed       Currently Documented PCP:    Facundo Root MD    PCP Phone Number:    [phone]     Follow Up Details: 2-3 days        Discharge Follow-up with Specified Provider: Dr Rashid; 1 Month   As directed      To: Dr Rashid   Follow Up: 1 Month   Follow Up Details: f/u on GB stent        Discharge Follow-up with Specified Provider: dr Benito Elena; 2 Weeks   As directed      To: dr Benito Elena   Follow Up: 2 Weeks   Follow Up Details: f/u on gall bladder and possible surgery                Time spent on Discharge including  face to face service:  >30 minutes    Signature: Electronically signed by Amaya Rg MD, 11/12/24, 07:37 EST.  [facility] Hospitalist Team

## 2024-11-15 LAB
BACTERIA SPEC AEROBE CULT: NORMAL
BACTERIA SPEC AEROBE CULT: NORMAL

## 2025-01-27 ENCOUNTER — ANESTHESIA EVENT (OUTPATIENT)
Dept: GASTROENTEROLOGY | Facility: HOSPITAL | Age: 75
End: 2025-01-27
Payer: MEDICARE

## 2025-01-28 ENCOUNTER — HOSPITAL ENCOUNTER (OUTPATIENT)
Facility: HOSPITAL | Age: 75
Setting detail: HOSPITAL OUTPATIENT SURGERY
Discharge: HOME | End: 2025-01-28
Attending: INTERNAL MEDICINE | Admitting: INTERNAL MEDICINE
Payer: MEDICARE

## 2025-01-28 ENCOUNTER — ON CAMPUS - OUTPATIENT (OUTPATIENT)
Dept: URBAN - METROPOLITAN AREA HOSPITAL 85 | Facility: HOSPITAL | Age: 75
End: 2025-01-28
Payer: MEDICARE

## 2025-01-28 ENCOUNTER — ANESTHESIA (OUTPATIENT)
Dept: GASTROENTEROLOGY | Facility: HOSPITAL | Age: 75
End: 2025-01-28
Payer: MEDICARE

## 2025-01-28 ENCOUNTER — APPOINTMENT (OUTPATIENT)
Dept: GENERAL RADIOLOGY | Facility: HOSPITAL | Age: 75
End: 2025-01-28
Payer: MEDICARE

## 2025-01-28 VITALS
WEIGHT: 148 LBS | OXYGEN SATURATION: 99 % | DIASTOLIC BLOOD PRESSURE: 95 MMHG | HEART RATE: 63 BPM | RESPIRATION RATE: 16 BRPM | SYSTOLIC BLOOD PRESSURE: 165 MMHG | TEMPERATURE: 98 F | HEIGHT: 67 IN | BODY MASS INDEX: 23.23 KG/M2

## 2025-01-28 DIAGNOSIS — K80.50 CALCULUS OF BILE DUCT WITHOUT CHOLANGITIS OR CHOLECYSTITIS W: ICD-10-CM

## 2025-01-28 DIAGNOSIS — T85.590A OTHER MECHANICAL COMPLICATION OF BILE DUCT PROSTHESIS, INITI: ICD-10-CM

## 2025-01-28 PROCEDURE — 25010000002 DEXAMETHASONE PER 1 MG: Performed by: NURSE ANESTHETIST, CERTIFIED REGISTERED

## 2025-01-28 PROCEDURE — 43264 ERCP REMOVE DUCT CALCULI: CPT | Performed by: INTERNAL MEDICINE

## 2025-01-28 PROCEDURE — 25810000003 LACTATED RINGERS PER 1000 ML: Performed by: NURSE ANESTHETIST, CERTIFIED REGISTERED

## 2025-01-28 PROCEDURE — 43275 ERCP REMOVE FORGN BODY DUCT: CPT | Performed by: INTERNAL MEDICINE

## 2025-01-28 PROCEDURE — C1769 GUIDE WIRE: HCPCS | Performed by: INTERNAL MEDICINE

## 2025-01-28 PROCEDURE — 25010000002 PROPOFOL 200 MG/20ML EMULSION: Performed by: NURSE ANESTHETIST, CERTIFIED REGISTERED

## 2025-01-28 PROCEDURE — 25010000002 SUCCINYLCHOLINE PER 20 MG: Performed by: NURSE ANESTHETIST, CERTIFIED REGISTERED

## 2025-01-28 PROCEDURE — 25810000003 SODIUM CHLORIDE 0.9 % SOLUTION: Performed by: INTERNAL MEDICINE

## 2025-01-28 PROCEDURE — 25010000002 LIDOCAINE PF 2% 2 % SOLUTION: Performed by: NURSE ANESTHETIST, CERTIFIED REGISTERED

## 2025-01-28 PROCEDURE — 74330 X-RAY BILE/PANC ENDOSCOPY: CPT

## 2025-01-28 PROCEDURE — 25010000002 SUGAMMADEX 200 MG/2ML SOLUTION: Performed by: NURSE ANESTHETIST, CERTIFIED REGISTERED

## 2025-01-28 PROCEDURE — 25010000002 ONDANSETRON PER 1 MG: Performed by: NURSE ANESTHETIST, CERTIFIED REGISTERED

## 2025-01-28 RX ORDER — MEPERIDINE HYDROCHLORIDE 25 MG/ML
12.5 INJECTION INTRAMUSCULAR; INTRAVENOUS; SUBCUTANEOUS
Status: DISCONTINUED | OUTPATIENT
Start: 2025-01-28 | End: 2025-01-28 | Stop reason: HOSPADM

## 2025-01-28 RX ORDER — DIPHENHYDRAMINE HYDROCHLORIDE 50 MG/ML
12.5 INJECTION INTRAMUSCULAR; INTRAVENOUS
Status: DISCONTINUED | OUTPATIENT
Start: 2025-01-28 | End: 2025-01-28 | Stop reason: HOSPADM

## 2025-01-28 RX ORDER — LABETALOL HYDROCHLORIDE 5 MG/ML
5 INJECTION, SOLUTION INTRAVENOUS
Status: DISCONTINUED | OUTPATIENT
Start: 2025-01-28 | End: 2025-01-28 | Stop reason: HOSPADM

## 2025-01-28 RX ORDER — ONDANSETRON 2 MG/ML
INJECTION INTRAMUSCULAR; INTRAVENOUS AS NEEDED
Status: DISCONTINUED | OUTPATIENT
Start: 2025-01-28 | End: 2025-01-28 | Stop reason: SURG

## 2025-01-28 RX ORDER — EPHEDRINE SULFATE 5 MG/ML
5 INJECTION INTRAVENOUS ONCE AS NEEDED
Status: DISCONTINUED | OUTPATIENT
Start: 2025-01-28 | End: 2025-01-28 | Stop reason: HOSPADM

## 2025-01-28 RX ORDER — LIDOCAINE HYDROCHLORIDE 20 MG/ML
INJECTION, SOLUTION EPIDURAL; INFILTRATION; INTRACAUDAL; PERINEURAL AS NEEDED
Status: DISCONTINUED | OUTPATIENT
Start: 2025-01-28 | End: 2025-01-28 | Stop reason: SURG

## 2025-01-28 RX ORDER — DEXAMETHASONE SODIUM PHOSPHATE 4 MG/ML
INJECTION, SOLUTION INTRA-ARTICULAR; INTRALESIONAL; INTRAMUSCULAR; INTRAVENOUS; SOFT TISSUE AS NEEDED
Status: DISCONTINUED | OUTPATIENT
Start: 2025-01-28 | End: 2025-01-28 | Stop reason: SURG

## 2025-01-28 RX ORDER — SUCCINYLCHOLINE CHLORIDE 20 MG/ML
INJECTION INTRAMUSCULAR; INTRAVENOUS AS NEEDED
Status: DISCONTINUED | OUTPATIENT
Start: 2025-01-28 | End: 2025-01-28 | Stop reason: SURG

## 2025-01-28 RX ORDER — ONDANSETRON 2 MG/ML
4 INJECTION INTRAMUSCULAR; INTRAVENOUS ONCE AS NEEDED
Status: DISCONTINUED | OUTPATIENT
Start: 2025-01-28 | End: 2025-01-28 | Stop reason: HOSPADM

## 2025-01-28 RX ORDER — SODIUM CHLORIDE, SODIUM LACTATE, POTASSIUM CHLORIDE, CALCIUM CHLORIDE 600; 310; 30; 20 MG/100ML; MG/100ML; MG/100ML; MG/100ML
INJECTION, SOLUTION INTRAVENOUS CONTINUOUS PRN
Status: DISCONTINUED | OUTPATIENT
Start: 2025-01-28 | End: 2025-01-28 | Stop reason: SURG

## 2025-01-28 RX ORDER — IPRATROPIUM BROMIDE AND ALBUTEROL SULFATE 2.5; .5 MG/3ML; MG/3ML
3 SOLUTION RESPIRATORY (INHALATION) ONCE AS NEEDED
Status: DISCONTINUED | OUTPATIENT
Start: 2025-01-28 | End: 2025-01-28 | Stop reason: HOSPADM

## 2025-01-28 RX ORDER — SODIUM CHLORIDE 9 MG/ML
50 INJECTION, SOLUTION INTRAVENOUS CONTINUOUS
Status: DISCONTINUED | OUTPATIENT
Start: 2025-01-28 | End: 2025-01-28 | Stop reason: HOSPADM

## 2025-01-28 RX ORDER — ROCURONIUM BROMIDE 10 MG/ML
INJECTION, SOLUTION INTRAVENOUS AS NEEDED
Status: DISCONTINUED | OUTPATIENT
Start: 2025-01-28 | End: 2025-01-28 | Stop reason: SURG

## 2025-01-28 RX ORDER — INDOMETHACIN 100 MG
SUPPOSITORY, RECTAL RECTAL AS NEEDED
Status: DISCONTINUED | OUTPATIENT
Start: 2025-01-28 | End: 2025-01-28 | Stop reason: HOSPADM

## 2025-01-28 RX ORDER — HYDRALAZINE HYDROCHLORIDE 20 MG/ML
5 INJECTION INTRAMUSCULAR; INTRAVENOUS
Status: DISCONTINUED | OUTPATIENT
Start: 2025-01-28 | End: 2025-01-28 | Stop reason: HOSPADM

## 2025-01-28 RX ORDER — PROPOFOL 10 MG/ML
INJECTION, EMULSION INTRAVENOUS AS NEEDED
Status: DISCONTINUED | OUTPATIENT
Start: 2025-01-28 | End: 2025-01-28 | Stop reason: SURG

## 2025-01-28 RX ADMIN — LIDOCAINE HYDROCHLORIDE 60 MG: 20 INJECTION, SOLUTION EPIDURAL; INFILTRATION; INTRACAUDAL; PERINEURAL at 14:25

## 2025-01-28 RX ADMIN — PROPOFOL 170 MG: 10 INJECTION, EMULSION INTRAVENOUS at 14:25

## 2025-01-28 RX ADMIN — SUGAMMADEX 200 MG: 100 INJECTION, SOLUTION INTRAVENOUS at 14:51

## 2025-01-28 RX ADMIN — DEXAMETHASONE SODIUM PHOSPHATE 4 MG: 4 INJECTION, SOLUTION INTRAMUSCULAR; INTRAVENOUS at 14:37

## 2025-01-28 RX ADMIN — ONDANSETRON 4 MG: 2 INJECTION INTRAMUSCULAR; INTRAVENOUS at 14:47

## 2025-01-28 RX ADMIN — SUCCINYLCHOLINE CHLORIDE 100 MG: 20 INJECTION, SOLUTION INTRAMUSCULAR; INTRAVENOUS at 14:25

## 2025-01-28 RX ADMIN — SODIUM CHLORIDE 50 ML/HR: 9 INJECTION, SOLUTION INTRAVENOUS at 13:14

## 2025-01-28 RX ADMIN — ROCURONIUM BROMIDE 20 MG: 10 INJECTION, SOLUTION INTRAVENOUS at 14:32

## 2025-01-28 RX ADMIN — ROCURONIUM BROMIDE 10 MG: 10 INJECTION, SOLUTION INTRAVENOUS at 14:25

## 2025-01-28 RX ADMIN — SODIUM CHLORIDE, SODIUM LACTATE, POTASSIUM CHLORIDE, AND CALCIUM CHLORIDE: .6; .31; .03; .02 INJECTION, SOLUTION INTRAVENOUS at 14:22

## 2025-01-28 NOTE — ANESTHESIA PROCEDURE NOTES
Airway  Urgency: elective    Date/Time: 1/28/2025 2:26 PM  Airway not difficult    General Information and Staff    Patient location during procedure: OR    Indications and Patient Condition  Indications for airway management: airway protection    Preoxygenated: yes  MILS maintained throughout  Mask difficulty assessment: 1 - vent by mask    Final Airway Details  Final airway type: endotracheal airway      Successful airway: ETT  Cuffed: yes   Successful intubation technique: direct laryngoscopy  Facilitating devices/methods: intubating stylet and cricoid pressure  Endotracheal tube insertion site: oral  Blade: Sunshine  Blade size: 4  ETT size (mm): 8.0  Placement verified by: chest auscultation and capnometry   Inital cuff pressure (cm H2O): 23  Measured from: lips  Number of attempts at approach: 1  Assessment: lips, teeth, and gum same as pre-op and atraumatic intubation

## 2025-01-28 NOTE — H&P
Patient Care Team:  Facundo Root MD as PCP - General      Subjective .     History of present illness:    Teddy Vega is a 74 y.o. male who presents today for Procedure(s):  ENDOSCOPIC RETROGRADE CHOLANGIOPANCREATOGRAPHY for the indications listed below.     The updated Patient Profile was reviewed prior to the procedure, in conjunction with the Physical Exam, including medical conditions, surgical procedures, medications, allergies, family history and social history.     Pre-operatively, I reviewed the indication(s) for the procedure, the risks of the procedure [including but not limited to: unexpected bleeding possibly requiring hospitalization and/or unplanned repeat procedures, perforation possibly requiring surgical treatment, missed lesions and complications of sedation/MAC (also explained by anesthesia staff)].     I have evaluated the patient for risks associated with the planned anesthesia and the procedure to be performed and find the patient an acceptable candidate for IV sedation.    Multiple opportunities were provided for any questions or concerns, and all questions were answered satisfactorily before any anesthesia was administered. We will proceed with the planned procedure.      ASSESSMENT/PLAN:  74 years old male who has a history of cholangitis with removal of stone as well as placement of the stent removal of purulent material he is here for ERCP removal of stent and clearance of common bile duct      Past Medical History:  Past Medical History:   Diagnosis Date    Hypertension        Past Surgical History:  Past Surgical History:   Procedure Laterality Date    CHOLECYSTECTOMY      ERCP N/A 11/11/2024    Procedure: ENDOSCOPIC RETROGRADE CHOLANGIOPANCREATOGRAPHY, sphincterotomy, balloon clearance of common bile duct (9-12mm), biliary stent placement;  Surgeon: Julio Rashid MD;  Location: [facility] ENDOSCOPY;  Service: Gastroenterology;  Laterality: N/A;  post: choledocolithiasis        Social History:  Social History     Tobacco Use    Smoking status: Never    Smokeless tobacco: Never   Vaping Use    Vaping status: Never Used   Substance Use Topics    Alcohol use: Never     Comment: occ    Drug use: Never       Family History:  History reviewed. No pertinent family history.    Medications:  Medications Prior to Admission   Medication Sig Dispense Refill Last Dose/Taking    Omega-3 Fatty Acids (FISH OIL PO) Take  by mouth Daily.   Past Week    pantoprazole (PROTONIX) 40 MG EC tablet Take 1 tablet by mouth Daily.   1/27/2025       Scheduled Meds:   Continuous Infusions:sodium chloride, 50 mL/hr, Last Rate: 50 mL/hr (01/28/25 1314)      PRN Meds:.    ALLERGIES:  Patient has no known allergies.        Objective     Vital Signs:   Temp:  [98.2 °F (36.8 °C)] 98.2 °F (36.8 °C)  Heart Rate:  [63] 63  Resp:  [9] 9  BP: (147)/(91) 147/91    Physical Exam:      General Appearance:    Awake and alert, in no acute distress   Lungs:     Clear to auscultation bilaterally, respirations regular, even and unlabored    Heart:    Regular rhythm and normal rate, normal S1 and S2, no            murmur, no gallop, no rub   Abdomen:     Normal bowel sounds, soft, non-tender, no rebound or guarding, non-distended, no hepatosplenomegaly        Results Review:   I reviewed the patient's labs and imaging.  Lab Results (last 24 hours)       ** No results found for the last 24 hours. **            Imaging Results (Last 24 Hours)       ** No results found for the last 24 hours. **               I discussed the patients findings and my recommendations with the patient.  Julio Rashid MD  01/28/25  14:08 EST

## 2025-01-28 NOTE — OP NOTE
ENDOSCOPIC RETROGRADE CHOLANGIOPANCREATOGRAPHY Procedure Report    Patient Name:  Teddy Vega  YOB: 1950    Date of Surgery:  1/28/2025     Pre-Op Diagnosis:  Choledocholithiasis [K80.50]        Procedure/CPT® Codes:  ME ERCP DX COLLECTION SPECIMEN BRUSHING/WASHING [43651]    Procedure(s):  ENDOSCOPIC RETROGRADE CHOLANGIOPANCREATOGRAPHY WITH BILIARY STENT REMOVAL, AND SPHINCTEROTOMY AND BALLOON CLEARANCE OF BILE DUCT    Staff:  Surgeon(s):  Julio Rashid MD      Anesthesia: General    Description of Procedure:  A description of the procedure as well as risks, benefits and alternative methods were explained to the patient who voiced understanding and signed the corresponding consent form.Specifically risks of post-ERCP pancreatitis, bleeding, perforation, failure to canulate and adverse reaction to sedation were discussed. A physical exam was performed and vital signs were monitored throughout the procedure.    A  film was performed which was normal. With the patient in the semi-prone position, an Olympus side viewing endoscope was placed into the mouth and proceeded through the esophagus, stomach and second portion of the duodenum without difficulty. Limited views of the esophagus and stomach were normal. The ampulla was visualized and appeared normal. A [employer] hydrotome was used to cannulate the ampulla using wire guided technique. Bile was aspirated to ensure this was the duct of interest. Contrast was injected into the bile duct.      The scope was then retroflexed and the fundus was visualized. The procedure was not difficult and there were no immediate complications.    Findings:   Stent was noticed coming out of the major ampulla removed with a snare.  This was followed by selective cannulation of the common bile duct with a dream tome preloaded with a wire advanced intrahepatically followed by the catheter with opacification of the common bile duct showed small filling  defect in the distal part of the dilated common bile duct at least close to 1.7 to 1.8 cm around the common hepatic and upper part of the common bile duct area with a very smooth tapering with a residual sphincter.  Sphincterotomy was performed at 11 to 12 o'clock position to complete sphincterotomy subsequently 9 to 12 mm balloon was used with the retrieval of small stone.  Patient Toller procedure very well no immediate complication were noticed.  No stent was placed.  PD was not injected or accessed during this procedure patient did receive indomethacin as well as IV fluid.    Impression:  1.  Status post removal of stent and sludge from common bile duct after extension of sphincterotomy.  Common bile duct was cleared followed by negative occlusion cholangiogram.  2.  Small hiatal hernia.    Recommendations:  Follow the liver function test.  Clear liquids today.  Resume other p.o. medication.  Patient to call our office for any postprocedure abdominal pain fever chills or other symptoms.      Julio Rashid MD     Date: 1/28/2025    Time: 14:51 EST

## 2025-01-28 NOTE — ANESTHESIA PREPROCEDURE EVALUATION
Anesthesia Evaluation     Patient summary reviewed and Nursing notes reviewed   NPO Solid Status: > 8 hours  NPO Liquid Status: > 8 hours           Airway   Dental      Pulmonary - negative pulmonary ROS   Cardiovascular     (+) hypertension      Neuro/Psych- negative ROS  GI/Hepatic/Renal/Endo - negative ROS     Musculoskeletal (-) negative ROS    Abdominal    Substance History - negative use     OB/GYN negative ob/gyn ROS         Other        ROS/Med Hx Other: ERCP 11/2024 FOR STONES. [facility] GR I  SKULL FRACTURE IN PAST R FACIAL WEAKNESS              Anesthesia Plan    ASA 2     general     intravenous induction     Anesthetic plan, risks, benefits, and alternatives have been provided, discussed and informed consent has been obtained with: patient.  Pre-procedure education provided  Plan discussed with CRNA.    CODE STATUS:

## 2025-01-28 NOTE — ANESTHESIA POSTPROCEDURE EVALUATION
Patient: Teddy Vega    Procedure Summary       Date: 01/28/25 Room / Location: [facility] ENDOSCOPY 2 / [facility] ENDOSCOPY    Anesthesia Start: 1422 Anesthesia Stop: 1459    Procedure: ENDOSCOPIC RETROGRADE CHOLANGIOPANCREATOGRAPHY WITH BILIARY STENT REMOVAL, AND SPHINCTEROTOMY AND BALLOON CLEARANCE OF BILE DUCT Diagnosis:       Choledocholithiasis      (Choledocholithiasis [K80.50])    Surgeons: Julio Rashid MD Provider: Иван Stephens MD    Anesthesia Type: general ASA Status: 2            Anesthesia Type: general    Vitals  Vitals Value Taken Time   /90 01/28/25 1542   Temp 98 °F (36.7 °C) 01/28/25 1502   Pulse 61 01/28/25 1548   Resp 16 01/28/25 1525   SpO2 99 % 01/28/25 1548   Vitals shown include unfiled device data.        Post Anesthesia Care and Evaluation    Patient location during evaluation: PACU  Patient participation: complete - patient participated  Level of consciousness: awake  Pain scale: See nurse's notes for pain score.  Pain management: adequate    Airway patency: patent  Anesthetic complications: No anesthetic complications  PONV Status: none  Cardiovascular status: acceptable  Respiratory status: acceptable and spontaneous ventilation  Hydration status: acceptable    Comments: Patient seen and examined postoperatively; vital signs stable; SpO2 greater than or equal to 90%; cardiopulmonary status stable; nausea/vomiting adequately controlled; pain adequately controlled; no apparent anesthesia complications; patient discharged from anesthesia care when discharge criteria were met

## 2025-01-28 NOTE — DISCHARGE INSTRUCTIONS
A responsible adult should stay with you and you should rest quietly for the rest of the day.    Do not drink alcohol, drive, operate any heavy machinery or power tools or make any legal/important decisions for the next 24 hours.     Progress your diet as tolerated.  If you begin to experience severe pain, increased shortness of breath, racing heartbeat or a fever above 101 F, seek immediate medical attention.     Follow up with MD as instructed. Call office for results in 3 to 5 days if needed. [phone]      Impression:  1.  Status post removal of stent and sludge from common bile duct after extension of sphincterotomy.  Common bile duct was cleared followed by negative occlusion cholangiogram.  2.  Small hiatal hernia.     Recommendations:  Follow the liver function test.  Clear liquids today.  Resume other p.o. medication.  Patient to call our office for any postprocedure abdominal pain fever chills or other symptoms.

## (undated) DEVICE — SPHINCTEROTOME: Brand: DREAMTOME™ RX 44

## (undated) DEVICE — DEV POSITION LK AND BIOP CAP SYS

## (undated) DEVICE — PAD, GROUNDING, UNIVERSAL, SPLIT, 9': Brand: MEDLINE

## (undated) DEVICE — RETRIEVAL BALLOON CATHETER: Brand: EXTRACTOR™ PRO RX

## (undated) DEVICE — PK ENDO GI 50

## (undated) DEVICE — BITEBLOCK ENDO W/STRAP 60F A/ LF DISP

## (undated) DEVICE — SPHINCTEROTOME: Brand: HYDRATOME RX 44

## (undated) DEVICE — CANNULATING SPHINCTEROTOME: Brand: JAGTOME™ REVOLUTION RX